# Patient Record
Sex: MALE | HISPANIC OR LATINO | Employment: FULL TIME | URBAN - METROPOLITAN AREA
[De-identification: names, ages, dates, MRNs, and addresses within clinical notes are randomized per-mention and may not be internally consistent; named-entity substitution may affect disease eponyms.]

---

## 2017-01-25 ENCOUNTER — OFFICE VISIT (OUTPATIENT)
Dept: MEDICAL GROUP | Facility: MEDICAL CENTER | Age: 22
End: 2017-01-25
Payer: COMMERCIAL

## 2017-01-25 VITALS
HEIGHT: 70 IN | BODY MASS INDEX: 40.81 KG/M2 | DIASTOLIC BLOOD PRESSURE: 78 MMHG | RESPIRATION RATE: 20 BRPM | TEMPERATURE: 97.3 F | WEIGHT: 285.06 LBS | HEART RATE: 90 BPM | OXYGEN SATURATION: 95 % | SYSTOLIC BLOOD PRESSURE: 132 MMHG

## 2017-01-25 DIAGNOSIS — E55.9 VITAMIN D DEFICIENCY: ICD-10-CM

## 2017-01-25 DIAGNOSIS — E78.2 MIXED HYPERLIPIDEMIA: ICD-10-CM

## 2017-01-25 DIAGNOSIS — M25.551 RIGHT HIP PAIN: ICD-10-CM

## 2017-01-25 DIAGNOSIS — R73.03 PREDIABETES: ICD-10-CM

## 2017-01-25 DIAGNOSIS — H93.13 TINNITUS, BILATERAL: ICD-10-CM

## 2017-01-25 PROCEDURE — 99214 OFFICE O/P EST MOD 30 MIN: CPT | Performed by: FAMILY MEDICINE

## 2017-01-25 NOTE — MR AVS SNAPSHOT
"        Peña Olson   2017 8:40 AM   Office Visit   MRN: [id number]    Department:  [place] [address]   Dept Phone:  [phone]    Description:  Male : 1995   Provider:  Hoa Garrido M.D.           Reason for Visit     Follow-Up labs      Allergies as of 2017     No Known Allergies      You were diagnosed with     Tinnitus, bilateral   [648455]       Prediabetes   [639978]       Mixed hyperlipidemia   [272.2.ICD-9-CM]       Right hip pain   [951105]         Vital Signs     Blood Pressure Pulse Temperature Respirations Height Weight    132/78 mmHg 90 36.3 °C (97.3 °F) 20 1.778 m (5' 10\") 129.3 kg (285 lb 0.9 oz)    Body Mass Index Oxygen Saturation Smoking Status             40.90 kg/m2 95% Never Smoker          Basic Information     Date Of Birth Sex Race Ethnicity Preferred Language    1995 Male  or   Origin (H[place]er,Sri Lankan,Turkmen,Andre, etc) English      Your appointments     2017  2:20 PM   Established Patient with Hoa Garrido M.D.   [facility]    [address]  [address]  [facility] [address] [phone]           You will be receiving a confirmation call a few days before your appointment from our automated call confirmation system.              Problem List              ICD-10-CM Priority Class Noted - Resolved    Tinnitus H93.19   10/19/2016 - Present    History of tics Z86.69   10/19/2016 - Present    Primary insomnia F51.01   10/19/2016 - Present    Situational anxiety F41.8   10/19/2016 - Present    Morbid obesity due to excess calories (CMS-HCC) E66.01   10/19/2016 - Present    Snoring R06.83   10/19/2016 - Present    Essential hypertension I10   10/19/2016 - Present    Prediabetes R73.03   2017 - Present    Mixed hyperlipidemia E78.2   2017 - Present      Health Maintenance        Date Due Completion Dates    IMM HEP B VACCINE (1 of 3 - Primary Series) " 1995 ---    IMM HPV VACCINE (1 of 3 - Male 3 Dose Series) 9/26/2007 ---    IMM VARICELLA (CHICKENPOX) VACCINE (1 of 2 - 2 Dose Adolescent Series) 9/26/2009 ---    IMM MENINGOCOCCAL VACCINE (MCV4) (1 of 1) 9/26/2012 ---    IMM HEP A VACCINE (2 of 2 - Standard Series) 6/9/2015 12/9/2014    IMM DTaP/Tdap/Td Vaccine (1 - Tdap) 9/26/2015 ---    IMM INFLUENZA (1) 9/1/2016 ---            Current Immunizations     Hepatitis A Vaccine, Adult 12/9/2014      Below and/or attached are the medications your provider expects you to take. Review all of your home medications and newly ordered medications with your provider and/or pharmacist. Follow medication instructions as directed by your provider and/or pharmacist. Please keep your medication list with you and share with your provider. Update the information when medications are discontinued, doses are changed, or new medications (including over-the-counter products) are added; and carry medication information at all times in the event of emergency situations     Allergies:  No Known Allergies          Medications  Valid as of: January 25, 2017 -  8:41 AM    Generic Name Brand Name Tablet Size Instructions for use    .                 Medicines prescribed today were sent to:     [facility] - [address] [address]    Phone: [phone] Fax: [phone]    Open 24 Hours?: No      Medication refill instructions:       If your prescription bottle indicates you have medication refills left, it is not necessary to call your provider’s office. Please contact your pharmacy and they will refill your medication.    If your prescription bottle indicates you do not have any refills left, you may request refills at any time through one of the following ways: The online [employer] system (except Urgent Care), by calling your provider’s office, or by asking your pharmacy to contact your provider’s office with a refill request. Medication refills are  processed only during regular business hours and may not be available until the next business day. Your provider may request additional information or to have a follow-up visit with you prior to refilling your medication.   *Please Note: Medication refills are assigned a new Rx number when refilled electronically. Your pharmacy may indicate that no refills were authorized even though a new prescription for the same medication is available at the pharmacy. Please request the medicine by name with the pharmacy before contacting your provider for a refill.        Your To Do List     Future Labs/Procedures Complete By Expires    HEMOGLOBIN A1C  As directed 1/26/2018    LIPID PROFILE  As directed 1/26/2018      Referral     A referral request has been sent to our patient care coordination department. Please allow 3-5 business days for us to process this request and contact you either by phone or mail. If you do not hear from us by the 5th business day, please call us at [phone].        Instructions    [facility]  [address] [facility] [address]  [phone]     Start taking vitamin D 2000 units every day          [employer] Access Code: Activation code not generated  Current [employer] Status: Active

## 2017-01-25 NOTE — PROGRESS NOTES
Subjective:   Peña Olson is a 21 y.o. male here today for   Chief Complaint   Patient presents with   • Follow-Up     labs       1. Tinnitus, bilateral  This is chronic. Patient did not follow-up with audiology for hearing test. And is The ringing is constant tinnitus in both ears. He does not have any hearing loss, weakness. It is staying the same. It is a high-pitched ringing sound. It is becoming more difficult to block out. He does use white noise at home    2. Prediabetes  This is a new problem. Patient is obese. He does drink sodas and does not watch his diet. He eats a lot of fast food.  Results for PEÑA OLSON (MRN [id number]) as of 1/25/2017 09:16   Ref. Range 10/25/2016 13:15   Glycohemoglobin Latest Ref Range: 0.0-5.6 % 5.9 (H)   Estim. Avg Glu Latest Units: mg/dL 123     Signature for  3. Mixed hyperlipidemia  This is a new problem. The patient does not watch his diet. He doesn't exercise. Does not drink any cardiovascular fitness.  Results for PEÑA OLSON (MRN [id number]) as of 1/25/2017 09:16   Ref. Range 10/25/2016 13:15   Cholesterol,Tot Latest Ref Range: 100-199 mg/dL 191   Triglycerides Latest Ref Range: 0-149 mg/dL 150 (H)   HDL Latest Ref Range: >=40 mg/dL 33 (A)   LDL Latest Ref Range: <100 mg/dL 128 (H)   4. Right hip pain  This is a new problem.  Started hurting 2 weeks ago. It pops out of place and feels like it is grinding on the inside. It improves after stretching. He denies any recent injury     5. Vitamin D deficiency  This is a new problem. Labs are below. He denies any falls, depression, fractures.  Results for PEÑA OLSON (MRN [id number]) as of 1/25/2017 09:16   Ref. Range 10/25/2016 13:15   25-Hydroxy   Vitamin D 25 Latest Ref Range:  ng/mL 17 (L)     Current medicines (including changes today)  No current outpatient prescriptions on file.     No current facility-administered medications for this visit.     He  has a past medical history of Hypertension. He also has  "no past medical history of Diabetes or ASTHMA.    ROS   No chest pain, no shortness of breath, no abdominal pain       Objective:     Blood pressure 132/78, pulse 90, temperature 36.3 °C (97.3 °F), resp. rate 20, height 1.778 m (5' 10\"), weight 129.3 kg (285 lb 0.9 oz), SpO2 95 %. Body mass index is 40.9 kg/(m^2).   Physical Exam:  Constitutional: Alert, no distress.  Skin: Warm, dry, good turgor, no rashes in visible areas.  Eye: Equal, round and reactive, conjunctiva clear, lids normal.  ENMT: Lips without lesions, good dentition, oropharynx clear.  Neck: Trachea midline, no masses, no thyromegaly. No cervical or supraclavicular lymphadenopathy  Respiratory: Unlabored respiratory effort, lungs clear to auscultation, no wheezes, no ronchi.  Cardiovascular: Normal S1, S2, no murmur, no edema.  Abdomen: Soft, non-tender, no masses, no hepatosplenomegaly.  Psych: Alert and oriented x3, normal affect and mood.  MSK: Limited range of motion of the hip with internal and external rotation. There is no pain with this. Negative Candice's test. No tenderness to palpation along the bursa.       Assessment and Plan:   The following treatment plan was discussed    1. Tinnitus, bilateral  Chronic, stable  A referral to audiology has been made    2. Prediabetes  New, controlled  Offered nutrition services, patient declines. He is reluctant to her underlying exercise plan understands the risks of diabetes  - HEMOGLOBIN A1C; Future    3. Mixed hyperlipidemia  New, controlled  Discussed importance of diet and exercise  - LIPID PROFILE; Future    4. Right hip pain  New, etiology likely secondary to musculoskeletal pain. Osteoarthritis is a possibility given the location.  Referral to physical therapy  Stretches given  - REFERRAL TO PHYSICAL THERAPY Reason for Therapy: Eval/Treat/Report    5. Vitamin D deficiency  This is a new problem and controlled  Vitamin D 2000 units a day      Followup: Return in about 6 months (around 7/25/2017) " for Labs, tinnitus, hip pain.

## 2017-01-25 NOTE — PATIENT INSTRUCTIONS
[facility]  [address] [facility] [address]  [phone]     Start taking vitamin D 2000 units every day

## 2017-04-10 ENCOUNTER — OFFICE VISIT (OUTPATIENT)
Dept: MEDICAL GROUP | Facility: MEDICAL CENTER | Age: 22
End: 2017-04-10
Payer: COMMERCIAL

## 2017-04-10 VITALS
BODY MASS INDEX: 40.37 KG/M2 | RESPIRATION RATE: 16 BRPM | OXYGEN SATURATION: 94 % | DIASTOLIC BLOOD PRESSURE: 78 MMHG | WEIGHT: 282 LBS | SYSTOLIC BLOOD PRESSURE: 128 MMHG | HEIGHT: 70 IN | TEMPERATURE: 97.6 F | HEART RATE: 99 BPM

## 2017-04-10 DIAGNOSIS — H92.02 LEFT EAR PAIN: ICD-10-CM

## 2017-04-10 PROBLEM — E66.01 MORBID OBESITY WITH BMI OF 40.0-44.9, ADULT (HCC): Status: ACTIVE | Noted: 2017-04-10

## 2017-04-10 PROCEDURE — 99214 OFFICE O/P EST MOD 30 MIN: CPT | Performed by: FAMILY MEDICINE

## 2017-04-10 RX ORDER — AMOXICILLIN 875 MG/1
875 TABLET, COATED ORAL 2 TIMES DAILY
Qty: 20 TAB | Refills: 0 | Status: SHIPPED | OUTPATIENT
Start: 2017-04-10 | End: 2017-04-20

## 2017-04-10 RX ORDER — FLUTICASONE PROPIONATE 50 MCG
2 SPRAY, SUSPENSION (ML) NASAL DAILY
Qty: 16 G | Refills: 3 | Status: SHIPPED | OUTPATIENT
Start: 2017-04-10 | End: 2017-07-28

## 2017-04-10 NOTE — PROGRESS NOTES
"Subjective:   Peña Olson is a 21 y.o. male here today for left ear pain  Patient arrived 10 minutes late for appointment.    Left ear pain  Patient has had left ear pain for the last week. He states that there is a placed right below his left ear, when he pushes in which is painful. He states he is also had decreased hearing over that time. Patient's last ear infection was many years ago.  He denies any drainage from the left ear.  Patient states that the pain is not severe.         Current medicines (including changes today)  No current outpatient prescriptions on file.     No current facility-administered medications for this visit.     He  has a past medical history of Hypertension. He also has no past medical history of Diabetes or ASTHMA.    ROS   No fever, no difficulty with chewing, positive tinnitus       Objective:     Blood pressure 128/78, pulse 99, temperature 36.4 °C (97.6 °F), resp. rate 16, height 1.778 m (5' 10\"), weight 127.914 kg (282 lb), SpO2 94 %. Body mass index is 40.46 kg/(m^2).   Physical Exam:  Constitutional: Alert, no distress.  Skin: Warm, dry, good turgor, no rashes in visible areas.  Eye: Equal, round and reactive, conjunctiva clear, lids normal.  ENMT: Lips without lesions, good dentition, oropharynx clear. Left TM with serous bulging greater on left than right, no rupture of TM, auditory canal is normal.  Psych: Alert and oriented x3, normal affect and mood.        Assessment and Plan:   The following treatment plan was discussed    1. Left ear pain  Possible otitis media versus eustachian tube dysfunction. Prescription for amoxicillin, Flonase and auralgan.  If no improvement consider follow-up or ENT referral.  - amoxicillin (AMOXIL) 875 MG tablet; Take 1 Tab by mouth 2 times a day for 10 days.  Dispense: 20 Tab; Refill: 0  - fluticasone (FLONASE) 50 MCG/ACT nasal spray; Spray 2 Sprays in nose every day.  Dispense: 16 g; Refill: 3  - antipyrine-benzocaine (AURALGAN) otic " solution; Place 1-4 Drops in ear every 2 hours as needed.  Dispense: 1 Bottle; Refill: 0        Followup: Return if symptoms worsen or fail to improve.

## 2017-04-10 NOTE — ASSESSMENT & PLAN NOTE
Patient has had left ear pain for the last week. He states that there is a placed right below his left ear, when he pushes in which is painful. He states he is also had decreased hearing over that time. Patient's last ear infection was many years ago.  He denies any drainage from the left ear.  Patient states that the pain is not severe.

## 2017-07-28 ENCOUNTER — HOSPITAL ENCOUNTER (OUTPATIENT)
Dept: LAB | Facility: MEDICAL CENTER | Age: 22
End: 2017-07-28
Attending: PHYSICIAN ASSISTANT
Payer: COMMERCIAL

## 2017-07-28 ENCOUNTER — OFFICE VISIT (OUTPATIENT)
Dept: MEDICAL GROUP | Facility: MEDICAL CENTER | Age: 22
End: 2017-07-28
Payer: COMMERCIAL

## 2017-07-28 VITALS
HEART RATE: 102 BPM | TEMPERATURE: 97.1 F | DIASTOLIC BLOOD PRESSURE: 82 MMHG | RESPIRATION RATE: 16 BRPM | SYSTOLIC BLOOD PRESSURE: 126 MMHG | HEIGHT: 70 IN | BODY MASS INDEX: 40.94 KG/M2 | WEIGHT: 286 LBS | OXYGEN SATURATION: 95 %

## 2017-07-28 DIAGNOSIS — E66.01 MORBID OBESITY WITH BMI OF 40.0-44.9, ADULT (HCC): ICD-10-CM

## 2017-07-28 DIAGNOSIS — E78.2 MIXED HYPERLIPIDEMIA: ICD-10-CM

## 2017-07-28 DIAGNOSIS — R73.03 PREDIABETES: ICD-10-CM

## 2017-07-28 DIAGNOSIS — R10.84 GENERALIZED ABDOMINAL PAIN: ICD-10-CM

## 2017-07-28 PROBLEM — H92.02 LEFT EAR PAIN: Status: RESOLVED | Noted: 2017-04-10 | Resolved: 2017-07-28

## 2017-07-28 LAB
ALBUMIN SERPL BCP-MCNC: 4.3 G/DL (ref 3.2–4.9)
ALBUMIN/GLOB SERPL: 1.3 G/DL
ALP SERPL-CCNC: 92 U/L (ref 30–99)
ALT SERPL-CCNC: 38 U/L (ref 2–50)
ANION GAP SERPL CALC-SCNC: 5 MMOL/L (ref 0–11.9)
AST SERPL-CCNC: 20 U/L (ref 12–45)
BASOPHILS # BLD AUTO: 0.7 % (ref 0–1.8)
BASOPHILS # BLD: 0.09 K/UL (ref 0–0.12)
BILIRUB SERPL-MCNC: 0.3 MG/DL (ref 0.1–1.5)
BUN SERPL-MCNC: 9 MG/DL (ref 8–22)
CALCIUM SERPL-MCNC: 9.8 MG/DL (ref 8.5–10.5)
CHLORIDE SERPL-SCNC: 106 MMOL/L (ref 96–112)
CO2 SERPL-SCNC: 29 MMOL/L (ref 20–33)
CREAT SERPL-MCNC: 0.97 MG/DL (ref 0.5–1.4)
EOSINOPHIL # BLD AUTO: 0.16 K/UL (ref 0–0.51)
EOSINOPHIL NFR BLD: 1.3 % (ref 0–6.9)
ERYTHROCYTE [DISTWIDTH] IN BLOOD BY AUTOMATED COUNT: 39.6 FL (ref 35.9–50)
EST. AVERAGE GLUCOSE BLD GHB EST-MCNC: 120 MG/DL
GFR SERPL CREATININE-BSD FRML MDRD: >60 ML/MIN/1.73 M 2
GLOBULIN SER CALC-MCNC: 3.2 G/DL (ref 1.9–3.5)
GLUCOSE SERPL-MCNC: 115 MG/DL (ref 65–99)
HBA1C MFR BLD: 5.8 % (ref 0–5.6)
HCT VFR BLD AUTO: 50.2 % (ref 42–52)
HGB BLD-MCNC: 16.6 G/DL (ref 14–18)
IMM GRANULOCYTES # BLD AUTO: 0.06 K/UL (ref 0–0.11)
IMM GRANULOCYTES NFR BLD AUTO: 0.5 % (ref 0–0.9)
LIPASE SERPL-CCNC: 17 U/L (ref 11–82)
LYMPHOCYTES # BLD AUTO: 4.07 K/UL (ref 1–4.8)
LYMPHOCYTES NFR BLD: 33.8 % (ref 22–41)
MCH RBC QN AUTO: 27.8 PG (ref 27–33)
MCHC RBC AUTO-ENTMCNC: 33.1 G/DL (ref 33.7–35.3)
MCV RBC AUTO: 84.1 FL (ref 81.4–97.8)
MONOCYTES # BLD AUTO: 0.87 K/UL (ref 0–0.85)
MONOCYTES NFR BLD AUTO: 7.2 % (ref 0–13.4)
NEUTROPHILS # BLD AUTO: 6.8 K/UL (ref 1.82–7.42)
NEUTROPHILS NFR BLD: 56.5 % (ref 44–72)
NRBC # BLD AUTO: 0 K/UL
NRBC BLD AUTO-RTO: 0 /100 WBC
PLATELET # BLD AUTO: 287 K/UL (ref 164–446)
PMV BLD AUTO: 10.2 FL (ref 9–12.9)
POTASSIUM SERPL-SCNC: 4.7 MMOL/L (ref 3.6–5.5)
PROT SERPL-MCNC: 7.5 G/DL (ref 6–8.2)
RBC # BLD AUTO: 5.97 M/UL (ref 4.7–6.1)
SODIUM SERPL-SCNC: 140 MMOL/L (ref 135–145)
WBC # BLD AUTO: 12.1 K/UL (ref 4.8–10.8)

## 2017-07-28 PROCEDURE — 80053 COMPREHEN METABOLIC PANEL: CPT

## 2017-07-28 PROCEDURE — 85025 COMPLETE CBC W/AUTO DIFF WBC: CPT

## 2017-07-28 PROCEDURE — 99214 OFFICE O/P EST MOD 30 MIN: CPT | Performed by: PHYSICIAN ASSISTANT

## 2017-07-28 PROCEDURE — 36415 COLL VENOUS BLD VENIPUNCTURE: CPT

## 2017-07-28 PROCEDURE — 83036 HEMOGLOBIN GLYCOSYLATED A1C: CPT

## 2017-07-28 PROCEDURE — 83690 ASSAY OF LIPASE: CPT

## 2017-07-28 ASSESSMENT — PAIN SCALES - GENERAL: PAINLEVEL: 3=SLIGHT PAIN

## 2017-07-28 NOTE — PROGRESS NOTES
"Subjective:   Peña Olson is a 21 y.o. male here today for abdominal pain intermittently for the past week.    Generalized abdominal pain  This is a 21-year-old male complains of a one week history of intermittent abdominal discomfort and pain at 3 out of 10 in various places around his stomach. Associated gas. Denies any nauseousness or vomiting. No fevers. No diarrhea or constipation. No heartburn or reflux. This morning around 10 he had a teriyaki bowl and a burger. No medications for her symptoms.    Mixed hyperlipidemia  Recently diagnosed with hyperlipidemia. His LDL level was in the 120s. He has not changed his diet since notification.    Morbid obesity with BMI of 40.0-44.9, adult (HCC)  He would prefer to be larger rather than small in size. Drinks sodas. Eats out most of the time. Does not exercise.    Prediabetes  Last hemoglobin A1c was 5.9. Looks as if it was supposed to be repeated.       Current medicines (including changes today)  No current outpatient prescriptions on file.     No current facility-administered medications for this visit.     He  has a past medical history of Hypertension. He also has no past medical history of Diabetes or ASTHMA.    ROS   No chest pain, no shortness of breath, no abdominal pain and all other systems were reviewed and are negative.       Objective:     Blood pressure 126/82, pulse 102, temperature 36.2 °C (97.1 °F), resp. rate 16, height 1.778 m (5' 10\"), weight 129.729 kg (286 lb), SpO2 95 %. Body mass index is 41.04 kg/(m^2).   Physical Exam:  Constitutional: Alert, no distress.  Skin: Warm, dry, good turgor, no rashes in visible areas.  Eye: Equal, round and reactive, conjunctiva clear, lids normal.  ENMT: Lips without lesions, good dentition, oropharynx clear.  Neck: Trachea midline, no masses.   Lymph: No cervical or supraclavicular lymphadenopathy  Respiratory: Unlabored respiratory effort, lungs clear to auscultation, no wheezes, no " wendyvonda.  Cardiovascular: Normal S1, S2, no murmur, no edema.  Abdomen: Soft, non-tender, no masses.  Psych: Alert and oriented x3, normal affect and mood.        Assessment and Plan:   The following treatment plan was discussed    1. Generalized abdominal pain  Acute, new onset condition. He does not have an acute abdomen. Symptoms likely secondary to poor food intake. Advised to eat healthier. We'll order labs. Contact me with any continuation of symptoms for possible imaging such as ultrasound versus CT scan.  - COMP METABOLIC PANEL; Future  - HEMOGLOBIN A1C; Future  - CBC WITH DIFFERENTIAL; Future  - LIPASE; Future    2. Prediabetes  Chronic history. Ordered hemoglobin A1c.  - HEMOGLOBIN A1C; Future    3. Mixed hyperlipidemia  Chronic history. He is currently not fasting so will have his PCP follow his hyperlipidemia.    4. Morbid obesity with BMI of 40.0-44.9, adult (HCC)  Urged to be healthier. Discussed with risks of obesity and developing diabetes. Suggested stopping all sodas and not eating 2 hours prior to bedtime to initiate this process.  - Patient identified as having weight management issue.  Appropriate orders and counseling given.      Followup: Return if symptoms worsen or fail to improve.    Please note that this dictation was created using voice recognition software. I have made every reasonable attempt to correct obvious errors, but I expect that there are errors of grammar and possibly content that I did not discover before finalizing the note.

## 2017-07-28 NOTE — ASSESSMENT & PLAN NOTE
He would prefer to be larger rather than small in size. Drinks sodas. Eats out most of the time. Does not exercise.

## 2017-07-28 NOTE — MR AVS SNAPSHOT
"Peña Olson   2017 11:40 AM   Office Visit   MRN: [id number]    Department:  [place] [address]   Dept Phone:  [phone]    Description:  Male : 1995   Provider:  Jhon Bolivar PA-C           Reason for Visit     Abdominal Pain on and off x 1 week      Allergies as of 2017     No Known Allergies      You were diagnosed with     Generalized abdominal pain   [120710]       Prediabetes   [127293]       Mixed hyperlipidemia   [272.2.ICD-9-CM]       Morbid obesity with BMI of 40.0-44.9, adult (CMS-HCC)   [879648]         Vital Signs     Blood Pressure Pulse Temperature Respirations Height Weight    126/82 mmHg 102 36.2 °C (97.1 °F) 16 1.778 m (5' 10\") 129.729 kg (286 lb)    Body Mass Index Oxygen Saturation Smoking Status             41.04 kg/m2 95% Never Smoker          Basic Information     Date Of Birth Sex Race Ethnicity Preferred Language    1995 Male  or   Origin ([place]an,[place]inean,Zimbabwean,Andre, etc) English      Problem List              ICD-10-CM Priority Class Noted - Resolved    Tinnitus H93.19   10/19/2016 - Present    History of tics Z86.69   10/19/2016 - Present    Primary insomnia F51.01   10/19/2016 - Present    Situational anxiety F41.8   10/19/2016 - Present    Snoring R06.83   10/19/2016 - Present    Essential hypertension I10   10/19/2016 - Present    Prediabetes R73.03   2017 - Present    Mixed hyperlipidemia E78.2   2017 - Present    Vitamin D deficiency E55.9   2017 - Present    Morbid obesity with BMI of 40.0-44.9, adult ([facility]) E66.01, Z68.41   4/10/2017 - Present    Generalized abdominal pain R10.84   2017 - Present      Health Maintenance        Date Due Completion Dates    IMM HEP B VACCINE (1 of 3 - Primary Series) 1996 ---    IMM HPV VACCINE (1 of 3 - Male 3 Dose Series) 2007 ---    IMM VARICELLA (CHICKENPOX) VACCINE (1 of 2 - 2 Dose Adolescent Series) 2009 ---    IMM MENINGOCOCCAL VACCINE " (MCV4) (1 of 1) 9/26/2012 ---    IMM HEP A VACCINE (2 of 2 - Standard Series) 6/9/2015 12/9/2014    IMM DTaP/Tdap/Td Vaccine (1 - Tdap) 9/26/2015 ---    IMM INFLUENZA (1) 9/1/2017 ---            Current Immunizations     Hepatitis A Vaccine, Adult 12/9/2014      Below and/or attached are the medications your provider expects you to take. Review all of your home medications and newly ordered medications with your provider and/or pharmacist. Follow medication instructions as directed by your provider and/or pharmacist. Please keep your medication list with you and share with your provider. Update the information when medications are discontinued, doses are changed, or new medications (including over-the-counter products) are added; and carry medication information at all times in the event of emergency situations     Allergies:  No Known Allergies          Medications  Valid as of: July 28, 2017 - 11:56 AM    Generic Name Brand Name Tablet Size Instructions for use    .                 Medicines prescribed today were sent to:     [address] [address]    Phone: [phone] Fax: [phone]    Open 24 Hours?: No    [facility] [address] [place] [address], [place] - [address] [address]    Phone: [phone] Fax: [phone]    Open 24 Hours?: No      Medication refill instructions:       If your prescription bottle indicates you have medication refills left, it is not necessary to call your provider’s office. Please contact your pharmacy and they will refill your medication.    If your prescription bottle indicates you do not have any refills left, you may request refills at any time through one of the following ways: The online [employer] system (except Urgent Care), by calling your provider’s office, or by asking your pharmacy to contact your provider’s office with a refill request. Medication refills are processed only during  regular business hours and may not be available until the next business day. Your provider may request additional information or to have a follow-up visit with you prior to refilling your medication.   *Please Note: Medication refills are assigned a new Rx number when refilled electronically. Your pharmacy may indicate that no refills were authorized even though a new prescription for the same medication is available at the pharmacy. Please request the medicine by name with the pharmacy before contacting your provider for a refill.        Your To Do List     Future Labs/Procedures Complete By Expires    CBC WITH DIFFERENTIAL  As directed 7/28/2018    COMP METABOLIC PANEL  As directed 7/28/2018    HEMOGLOBIN A1C  As directed 7/28/2018    LIPASE  As directed 7/28/2018         MyChart Access Code: Activation code not generated  Current [employer] Status: Active

## 2017-07-28 NOTE — ASSESSMENT & PLAN NOTE
This is a 21-year-old male complains of a one week history of intermittent abdominal discomfort and pain at 3 out of 10 in various places around his stomach. Associated gas. Denies any nauseousness or vomiting. No fevers. No diarrhea or constipation. No heartburn or reflux. This morning around 10 he had a teriyaki bowl and a burger. No medications for her symptoms.

## 2017-07-28 NOTE — ASSESSMENT & PLAN NOTE
Recently diagnosed with hyperlipidemia. His LDL level was in the 120s. He has not changed his diet since notification.

## 2017-07-30 DIAGNOSIS — D72.829 LEUKOCYTOSIS, UNSPECIFIED TYPE: ICD-10-CM

## 2017-07-31 ENCOUNTER — TELEPHONE (OUTPATIENT)
Dept: MEDICAL GROUP | Facility: MEDICAL CENTER | Age: 22
End: 2017-07-31

## 2017-07-31 NOTE — TELEPHONE ENCOUNTER
----- Message from Jhon Bolivar PA-C sent at 7/30/2017  9:13 AM PDT -----  Please contact Peña.  Labs without significant results.  Prediabetes is not not worse.  White blood cell count is still slightly elevated.  I will put in order for repeat CBC so do it nonfasting in 2 weeks.  Contact me with any worsening stomach symptoms.  Thank you.    Jhon

## 2017-07-31 NOTE — TELEPHONE ENCOUNTER
Phone Number Called: [phone]     Message: Left message for patient to call us back about lab results.     Left Message for patient to call back: yes and no

## 2017-08-01 NOTE — TELEPHONE ENCOUNTER
Phone Number Called: [phone] (home)     Message: number disconnected.     Left Message for patient to call back: N\A

## 2017-08-02 NOTE — TELEPHONE ENCOUNTER
Phone Number Called: [phone]    Message: Number was not working as of 08/02.    Left Message for patient to call back: N\A

## 2018-02-15 ENCOUNTER — OFFICE VISIT (OUTPATIENT)
Dept: MEDICAL GROUP | Facility: MEDICAL CENTER | Age: 23
End: 2018-02-15
Payer: COMMERCIAL

## 2018-02-15 ENCOUNTER — APPOINTMENT (OUTPATIENT)
Dept: MEDICAL GROUP | Facility: MEDICAL CENTER | Age: 23
End: 2018-02-15
Payer: COMMERCIAL

## 2018-02-15 VITALS
WEIGHT: 284.6 LBS | HEART RATE: 60 BPM | DIASTOLIC BLOOD PRESSURE: 60 MMHG | RESPIRATION RATE: 14 BRPM | BODY MASS INDEX: 40.74 KG/M2 | HEIGHT: 70 IN | TEMPERATURE: 98.2 F | SYSTOLIC BLOOD PRESSURE: 110 MMHG | OXYGEN SATURATION: 98 %

## 2018-02-15 DIAGNOSIS — Z86.59 HISTORY OF TICS: ICD-10-CM

## 2018-02-15 DIAGNOSIS — L91.8 INFLAMED SKIN TAG: ICD-10-CM

## 2018-02-15 DIAGNOSIS — R73.03 PREDIABETES: ICD-10-CM

## 2018-02-15 DIAGNOSIS — I10 ESSENTIAL HYPERTENSION: ICD-10-CM

## 2018-02-15 DIAGNOSIS — E66.01 MORBID OBESITY WITH BMI OF 40.0-44.9, ADULT (HCC): ICD-10-CM

## 2018-02-15 DIAGNOSIS — E55.9 VITAMIN D DEFICIENCY: ICD-10-CM

## 2018-02-15 DIAGNOSIS — E78.2 MIXED HYPERLIPIDEMIA: ICD-10-CM

## 2018-02-15 PROBLEM — R10.84 GENERALIZED ABDOMINAL PAIN: Status: RESOLVED | Noted: 2017-07-28 | Resolved: 2018-02-15

## 2018-02-15 PROCEDURE — 99214 OFFICE O/P EST MOD 30 MIN: CPT | Performed by: FAMILY MEDICINE

## 2018-02-15 ASSESSMENT — PATIENT HEALTH QUESTIONNAIRE - PHQ9: CLINICAL INTERPRETATION OF PHQ2 SCORE: 0

## 2018-02-15 NOTE — ASSESSMENT & PLAN NOTE
This had been a chronic problem for this patient in the past but is now going to the gym fairly regularly. Possibly that has helped his blood pressure normalized. His blood pressure here is 110/60 and his mom states that when she is checking his blood pressure at home and been good. He is not on medications. He's actually can start working on weight loss and I suspect his blood pressure will stay normal.

## 2018-02-15 NOTE — ASSESSMENT & PLAN NOTE
This patient is accompanied today by his mother who tells me that in the last 6 months he's had tics both in [place] and [place]. They're very concerned about the possibility of Lyme disease. They want him to be tested for this. He does not give any history of target skin lesions or joint pain. He does know that his tics were in his body for at least a day before he got them removed. We will get the blood work and see him back.

## 2018-02-15 NOTE — ASSESSMENT & PLAN NOTE
This is a chronic problem that the patient has had since teenager age. He states that as he went through puberty it started to get bigger. Now irritated by his clothing. He would like to have it removed. We will do that at his next visit in 4 weeks.

## 2018-02-16 NOTE — ASSESSMENT & PLAN NOTE
This is a chronic health problems for this patient that has been there since July 2017. Patient has not really done anything to try and get his lipids under control. He is getting ready to start a weight loss program. I would like to recheck his lipids in 6 weeks and see if he is able to achieve an increase in his HDL and get his LDL less than 100.

## 2018-02-16 NOTE — ASSESSMENT & PLAN NOTE
This patient has gone off of vitamin D supplementation despite the fact that he had a vitamin D level is only 17. We are going to have him start on vitamin D at 5000 units daily and recheck his level in 6 weeks.

## 2018-02-16 NOTE — ASSESSMENT & PLAN NOTE
This patient has a history of an elevated blood sugar up to 1:15 and July 2017. We will retest his baseline and get an A1c.

## 2018-02-16 NOTE — ASSESSMENT & PLAN NOTE
This is a chronic health problem for this patient that he has not been working on recently. We had a long discussion about the family history of diabetes and the need for him to lose weight. He is getting ready to start in interval training program along with lifting weights to see if that will be successful. We'll plan to see him back in 6 weeks hopefully with a 12 pound weight loss.

## 2018-02-16 NOTE — PROGRESS NOTES
CC: Hypertension, inflamed skin tags, history of tics, other chronic problems.    HISTORY OF PRESENT ILLNESS: Patient is a 22 y.o. male established patient who presents today to [facility] care transferring from Dr. Chairez. Patient has multiple chronic health problems as listed below. The reason he wanted to come in was he felt that he had a breast lump underneath the right nipple. What he is feeling is normal tissue. We will recheck this in 6 weeks when he returns.    Health Maintenance: Completed    Essential hypertension  This had been a chronic problem for this patient in the past but is now going to the gym fairly regularly. Possibly that has helped his blood pressure normalized. His blood pressure here is 110/60 and his mom states that when she is checking his blood pressure at home and been good. He is not on medications. He's actually can start working on weight loss and I suspect his blood pressure will stay normal.    Inflamed skin tag  This is a chronic problem that the patient has had since teenager age. He states that as he went through puberty it started to get bigger. Now irritated by his clothing. He would like to have it removed. We will do that at his next visit in 4 weeks.    History of tics  This patient is accompanied today by his mother who tells me that in the last 6 months he's had tics both in [place] and [place]. They're very concerned about the possibility of Lyme disease. They want him to be tested for this. He does not give any history of target skin lesions or joint pain. He does know that his tics were in his body for at least a day before he got them removed. We will get the blood work and see him back.    Mixed hyperlipidemia  This is a chronic health problems for this patient that has been there since July 2017. Patient has not really done anything to try and get his lipids under control. He is getting ready to start a weight loss program. I would like to recheck his  lipids in 6 weeks and see if he is able to achieve an increase in his HDL and get his LDL less than 100.    Morbid obesity with BMI of 40.0-44.9, adult ([facility])  This is a chronic health problem for this patient that he has not been working on recently. We had a long discussion about the family history of diabetes and the need for him to lose weight. He is getting ready to start in interval training program along with lifting weights to see if that will be successful. We'll plan to see him back in 6 weeks hopefully with a 12 pound weight loss.    Prediabetes  This patient has a history of an elevated blood sugar up to 1:15 and July 2017. We will retest his baseline and get an A1c.    Vitamin D deficiency  This patient has gone off of vitamin D supplementation despite the fact that he had a vitamin D level is only 17. We are going to have him start on vitamin D at 5000 units daily and recheck his level in 6 weeks.      Patient Active Problem List    Diagnosis Date Noted   • Inflamed skin tag 02/15/2018   • Morbid obesity with BMI of 40.0-44.9, adult (HCC) 04/10/2017   • Prediabetes 01/25/2017   • Mixed hyperlipidemia 01/25/2017   • Vitamin D deficiency 01/25/2017   • Tinnitus 10/19/2016   • History of tics 10/19/2016   • Primary insomnia 10/19/2016   • Situational anxiety 10/19/2016   • Snoring 10/19/2016      Allergies:Patient has no known allergies.    No current outpatient prescriptions on file.     No current facility-administered medications for this visit.        Social History   Substance Use Topics   • Smoking status: Never Smoker   • Smokeless tobacco: Never Used   • Alcohol use No     Social History     Social History Narrative   • No narrative on file       Family History   Problem Relation Age of Onset   • Hypertension Mother    • Cancer Maternal Aunt      bipolar   • Breast Cancer Maternal Aunt    • Cancer Maternal Grandmother      schizophrenia   • Hypertension Maternal Grandfather    • Diabetes Maternal  "Grandfather    • Heart Disease Maternal Grandfather    • No Known Problems Father    • Diabetes Paternal Grandfather         ROS:     - Constitutional:  Negative for fever, chills, unexpected weight change, and fatigue/generalized weakness.    - HEENT:  Negative for headaches, vision changes, hearing changes, ear pain, ear discharge, rhinorrhea, sinus congestion, sore throat, and neck pain.      - Respiratory: Negative for cough, sputum production, chest congestion, dyspnea, wheezing, and crackles.      - Cardiovascular: Negative for chest pain, palpitations, orthopnea, and bilateral lower extremity edema.     - Gastrointestinal: Negative for heartburn, nausea, vomiting, abdominal pain, hematochezia, melena, diarrhea, constipation, and greasy/foul-smelling stools.     - Genitourinary: Negative for dysuria, polyuria, hematuria, pyuria, urinary urgency, and urinary incontinence.     - Musculoskeletal: Negative for myalgias, back pain, and joint pain.     - Skin: Positive for an inflamed skin tag in the middle of his back. Irritated by clothing. Bleeds periodically.  Patient concerned about a lump underneath the nipple on the right breast.    - Neurological: Negative for dizziness, tingling, tremors, focal sensory deficit, focal weakness and headaches.     - Endo/Heme/Allergies: Does not bruise/bleed easily.     - Psychiatric/Behavioral: Negative for depression, suicidal/homicidal ideation and memory loss.          - NOTE: All other systems reviewed and are negative, except as in HPI.      Exam:    Blood pressure 110/60, pulse 60, temperature 36.8 °C (98.2 °F), resp. rate 14, height 1.778 m (5' 10\"), weight (!) 129.1 kg (284 lb 9.6 oz), SpO2 98 %. Body mass index is 40.84 kg/m².    General:  Well nourished, well developed male in NAD  Head is grossly normal.  Neck: Supple without JVD or bruit. Thyroid is not enlarged.  Pulmonary: Clear to ausculation and percussion.  Normal effort. No rales, ronchi, or " wheezing.  Cardiovascular: Regular rate and rhythm without murmur. Carotid and radial pulses are intact and equal bilaterally.  Extremities: no clubbing, cyanosis, or edema.  Skin: Patient has a large inflamed skin tag in the middle of his back shows some recent excoriation. Measures approximately 1 cm across and extends up away from the skin 1 cm.  Please note that this dictation was created using voice recognition software. I have made every reasonable attempt to correct obvious errors, but I expect that there are errors of grammar and possibly content that I did not discover before finalizing the note.    Assessment/Plan:  1. Essential hypertension  Appears to have resolved with lifestyle changes    2. Inflamed skin tag  Uncontrolled, we will remove this at his next office visit    3. Morbid obesity with BMI of 40.0-44.9, adult (HCC)  Uncontrolled, patient working on weight loss efforts  - Patient identified as having weight management issue.  Appropriate orders and counseling given.    4. Prediabetes  Uncontrolled, patient working weight loss efforts  - HEMOGLOBIN A1C; Future    5. Mixed hyperlipidemia  Uncontrolled, patient not doing anything to try and improve this. We will check baseline and see him back.  - COMP METABOLIC PANEL; Future  - LIPID PROFILE; Future    6. History of tics  Stable, patient does have a history of tick exposure in states where Burgdorferi could've been present. We will check for Lyme disease  - LYME (B. BURGDORFERI) PCR    7. Vitamin D deficiency  uncontrolled, patient went off vitamin D and his levels down to 17. He needs to be on 5000 units daily and we will recheck in 6 weeks.  - VITAMIN D,25 HYDROXY; Future

## 2018-02-21 ENCOUNTER — HOSPITAL ENCOUNTER (OUTPATIENT)
Dept: LAB | Facility: MEDICAL CENTER | Age: 23
End: 2018-02-21
Attending: FAMILY MEDICINE
Payer: COMMERCIAL

## 2018-02-21 DIAGNOSIS — E78.2 MIXED HYPERLIPIDEMIA: ICD-10-CM

## 2018-02-21 DIAGNOSIS — R73.03 PREDIABETES: ICD-10-CM

## 2018-02-21 DIAGNOSIS — E55.9 VITAMIN D DEFICIENCY: ICD-10-CM

## 2018-02-21 LAB
25(OH)D3 SERPL-MCNC: 6 NG/ML (ref 30–100)
ALBUMIN SERPL BCP-MCNC: 4.6 G/DL (ref 3.2–4.9)
ALBUMIN/GLOB SERPL: 1.5 G/DL
ALP SERPL-CCNC: 103 U/L (ref 30–99)
ALT SERPL-CCNC: 37 U/L (ref 2–50)
ANION GAP SERPL CALC-SCNC: 9 MMOL/L (ref 0–11.9)
AST SERPL-CCNC: 22 U/L (ref 12–45)
BILIRUB SERPL-MCNC: 0.5 MG/DL (ref 0.1–1.5)
BUN SERPL-MCNC: 13 MG/DL (ref 8–22)
CALCIUM SERPL-MCNC: 9.2 MG/DL (ref 8.5–10.5)
CHLORIDE SERPL-SCNC: 107 MMOL/L (ref 96–112)
CHOLEST SERPL-MCNC: 167 MG/DL (ref 100–199)
CO2 SERPL-SCNC: 26 MMOL/L (ref 20–33)
CREAT SERPL-MCNC: 0.83 MG/DL (ref 0.5–1.4)
EST. AVERAGE GLUCOSE BLD GHB EST-MCNC: 123 MG/DL
GLOBULIN SER CALC-MCNC: 3 G/DL (ref 1.9–3.5)
GLUCOSE SERPL-MCNC: 81 MG/DL (ref 65–99)
HBA1C MFR BLD: 5.9 % (ref 0–5.6)
HDLC SERPL-MCNC: 32 MG/DL
LDLC SERPL CALC-MCNC: 100 MG/DL
POTASSIUM SERPL-SCNC: 4 MMOL/L (ref 3.6–5.5)
PROT SERPL-MCNC: 7.6 G/DL (ref 6–8.2)
SODIUM SERPL-SCNC: 142 MMOL/L (ref 135–145)
TRIGL SERPL-MCNC: 173 MG/DL (ref 0–149)

## 2018-02-21 PROCEDURE — 82306 VITAMIN D 25 HYDROXY: CPT

## 2018-02-21 PROCEDURE — 86617 LYME DISEASE ANTIBODY: CPT | Mod: 91

## 2018-02-21 PROCEDURE — 83036 HEMOGLOBIN GLYCOSYLATED A1C: CPT

## 2018-02-21 PROCEDURE — 36415 COLL VENOUS BLD VENIPUNCTURE: CPT

## 2018-02-21 PROCEDURE — 80053 COMPREHEN METABOLIC PANEL: CPT

## 2018-02-21 PROCEDURE — 80061 LIPID PANEL: CPT

## 2018-02-23 LAB
B BURGDOR IGG SER QL IB: NEGATIVE
B BURGDOR IGM SER QL IB: NEGATIVE

## 2018-02-28 ENCOUNTER — HOSPITAL ENCOUNTER (EMERGENCY)
Dept: HOSPITAL 8 - ED | Age: 23
Discharge: HOME | End: 2018-02-28
Payer: COMMERCIAL

## 2018-02-28 VITALS — SYSTOLIC BLOOD PRESSURE: 147 MMHG | DIASTOLIC BLOOD PRESSURE: 89 MMHG

## 2018-02-28 VITALS — HEIGHT: 70 IN | WEIGHT: 275.58 LBS | BODY MASS INDEX: 39.45 KG/M2
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
## 2018-02-28 DIAGNOSIS — I10: ICD-10-CM

## 2018-02-28 DIAGNOSIS — S39.012A: Primary | ICD-10-CM

## 2018-02-28 PROCEDURE — 99284 EMERGENCY DEPT VISIT MOD MDM: CPT

## 2018-02-28 PROCEDURE — 72110 X-RAY EXAM L-2 SPINE 4/>VWS: CPT

## 2018-02-28 PROCEDURE — 96372 THER/PROPH/DIAG INJ SC/IM: CPT

## 2018-03-09 ENCOUNTER — OFFICE VISIT (OUTPATIENT)
Dept: URGENT CARE | Facility: CLINIC | Age: 23
End: 2018-03-09
Payer: COMMERCIAL

## 2018-03-09 ENCOUNTER — APPOINTMENT (OUTPATIENT)
Dept: RADIOLOGY | Facility: IMAGING CENTER | Age: 23
End: 2018-03-09
Attending: PHYSICIAN ASSISTANT
Payer: COMMERCIAL

## 2018-03-09 VITALS
SYSTOLIC BLOOD PRESSURE: 110 MMHG | BODY MASS INDEX: 40.4 KG/M2 | HEIGHT: 70 IN | RESPIRATION RATE: 18 BRPM | WEIGHT: 282.19 LBS | HEART RATE: 84 BPM | OXYGEN SATURATION: 95 % | DIASTOLIC BLOOD PRESSURE: 86 MMHG | TEMPERATURE: 97.9 F

## 2018-03-09 DIAGNOSIS — M25.551 PAIN OF RIGHT HIP JOINT: ICD-10-CM

## 2018-03-09 DIAGNOSIS — S39.012D BACK STRAIN, SUBSEQUENT ENCOUNTER: ICD-10-CM

## 2018-03-09 DIAGNOSIS — M54.31 SCIATICA OF RIGHT SIDE: ICD-10-CM

## 2018-03-09 PROCEDURE — 99214 OFFICE O/P EST MOD 30 MIN: CPT | Performed by: PHYSICIAN ASSISTANT

## 2018-03-09 PROCEDURE — 73501 X-RAY EXAM HIP UNI 1 VIEW: CPT | Mod: TC,RT | Performed by: PHYSICIAN ASSISTANT

## 2018-03-09 RX ORDER — NAPROXEN 500 MG/1
TABLET ORAL
Refills: 0 | COMMUNITY
Start: 2018-03-01 | End: 2018-03-29 | Stop reason: SDUPTHER

## 2018-03-09 RX ORDER — TRAMADOL HYDROCHLORIDE 50 MG/1
50 TABLET ORAL EVERY 8 HOURS PRN
Qty: 15 TAB | Refills: 0 | Status: SHIPPED | OUTPATIENT
Start: 2018-03-09 | End: 2018-03-14

## 2018-03-09 RX ORDER — METHOCARBAMOL 750 MG/1
TABLET, FILM COATED ORAL
Refills: 0 | COMMUNITY
Start: 2018-03-01 | End: 2018-04-02 | Stop reason: SDUPTHER

## 2018-03-09 RX ORDER — GABAPENTIN 300 MG/1
300 CAPSULE ORAL 3 TIMES DAILY
COMMUNITY
End: 2018-03-29 | Stop reason: SDUPTHER

## 2018-03-09 RX ORDER — METHYLPREDNISOLONE 4 MG/1
TABLET ORAL
Qty: 1 KIT | Refills: 0 | Status: SHIPPED | OUTPATIENT
Start: 2018-03-09 | End: 2018-05-01

## 2018-03-09 RX ORDER — IBUPROFEN 800 MG/1
800 TABLET ORAL EVERY 8 HOURS PRN
COMMUNITY
End: 2018-03-29

## 2018-03-09 ASSESSMENT — ENCOUNTER SYMPTOMS
ABDOMINAL PAIN: 0
FALLS: 0
SORE THROAT: 0
PERIANAL NUMBNESS: 0
EYE REDNESS: 0
DIARRHEA: 0
EYE DISCHARGE: 0
WHEEZING: 0
BOWEL INCONTINENCE: 0
FEVER: 0
COUGH: 0
PARESTHESIAS: 0
BACK PAIN: 1
CHILLS: 0
LEG PAIN: 0
NECK PAIN: 0
HEADACHES: 0
NAUSEA: 0
SINUS PAIN: 0
TINGLING: 0
SENSORY CHANGE: 0
NUMBNESS: 0
VOMITING: 0

## 2018-03-09 NOTE — PROGRESS NOTES
"Subjective:      Peña Olson is a 22 y.o. male who presents with Back Pain (x 9 days, Rt. lower back pain and Rt. hip pain after heavy lifting \"was seen at [facility] for this Problem\")          Patient is a 22-year-old male who presents today with continued right lower back pain with radiation down the back of his thigh since   Feb. 28 when he was originally evaluated at [facility]. Patient admits earlier that day he was at the gym carrying a heavy weight which he felt a \"tweak\" to his right mid to lower back after the dead lifts. He reports that he continued with the rest of his work out and notes that the pain became worse throughout the day. He denies any hx of same or other injury. Patient went to the emergency room and he was noted to have normal lumbar x-ray. Patient had significant improvement after Toradol injection and was also given Percocet. Patient was discharged with methocarbamol along with naproxen. Patient was given referral to follow up with Dr. Arceo spinal specialist along with [facility]. Patient reports minimal improvement with those medications although has had mild improvement with Lidoderm patch along with the TENS unit. He denies any saddle anesthesias, leg weakness, incontinence.  Note patient also reports right hip pain off and on for the last few years. Patient denies any originating injury, or trauma. He reports that he feels a grinding in the hip.    Back Pain   This is a new problem. Episode onset: Feb. 28.  The problem occurs constantly. The pain is at a severity of 7/10. The pain is moderate. The symptoms are aggravated by bending and position. Pertinent negatives include no abdominal pain, bladder incontinence, bowel incontinence, chest pain, dysuria, fever, headaches, leg pain, numbness, paresthesias, pelvic pain, perianal numbness or tingling. Risk factors include obesity. Treatments tried: As above.        Review of Systems   Constitutional: Negative for " "chills, fever and malaise/fatigue.   HENT: Negative for ear discharge, ear pain, sinus pain and sore throat.    Eyes: Negative for discharge and redness.   Respiratory: Negative for cough and wheezing.    Cardiovascular: Negative for chest pain and leg swelling.   Gastrointestinal: Negative for abdominal pain, bowel incontinence, diarrhea, nausea and vomiting.   Genitourinary: Negative for bladder incontinence, dysuria, pelvic pain and urgency.        Denies any new urinary or bowel incontinence   Musculoskeletal: Positive for back pain and joint pain. Negative for falls and neck pain.        Specifically without leg pain or weakness   Skin: Negative for itching and rash.   Neurological: Negative for tingling, sensory change, numbness, headaches and paresthesias.          Objective:     /86   Pulse 84   Temp 36.6 °C (97.9 °F)   Resp 18   Ht 1.778 m (5' 10\")   Wt (!) 128 kg (282 lb 3 oz)   SpO2 95%   BMI 40.49 kg/m²    PMH:  has a past medical history of Hypertension and Inflamed skin tag (2/15/2018). He also has no past medical history of ASTHMA or Diabetes.  MEDS:   Current Outpatient Prescriptions:   •  gabapentin (NEURONTIN) 300 MG Cap, Take 300 mg by mouth 3 times a day., Disp: , Rfl:   •  ibuprofen (MOTRIN) 800 MG Tab, Take 800 mg by mouth every 8 hours as needed., Disp: , Rfl:   •  tramadol (ULTRAM) 50 MG Tab, Take 1 Tab by mouth every 8 hours as needed for Severe Pain for up to 5 days., Disp: 15 Tab, Rfl: 0  •  MethylPREDNISolone (MEDROL DOSEPAK) 4 MG Tablet Therapy Pack, UAD, Disp: 1 Kit, Rfl: 0  •  methocarbamol (ROBAXIN) 750 MG Tab, TK 2 TS PO Q 6 H PRF MUSCLE SPASM, Disp: , Rfl: 0  •  naproxen (NAPROSYN) 500 MG Tab, TK 1 T PO  Q 12 H PRF PAIN, Disp: , Rfl: 0  ALLERGIES: No Known Allergies  SURGHX: No past surgical history on file.  SOCHX:  reports that he has never smoked. He has never used smokeless tobacco. He reports that he does not drink alcohol or use drugs.  FH: Family history was " reviewed, no pertinent findings to report    Physical Exam   Constitutional: He is oriented to person, place, and time. He appears well-developed and well-nourished. No distress.   HENT:   Head: Normocephalic and atraumatic.   Right Ear: External ear normal.   Left Ear: External ear normal.   Mouth/Throat: Oropharynx is clear and moist. No oropharyngeal exudate.   Eyes: Conjunctivae and EOM are normal. Pupils are equal, round, and reactive to light.   Neck: Normal range of motion. Neck supple. No tracheal deviation present.   Cardiovascular: Normal rate and regular rhythm.    No murmur heard.  Pulmonary/Chest: Effort normal and breath sounds normal. No respiratory distress.   Musculoskeletal:        Lumbar back: He exhibits decreased range of motion, tenderness, swelling, pain, spasm and abnormal pulse.        Back:    Spine- without midline tenderness, step-off or deformity. Without scoliosis or kyphosis. Noted right lower thoracic Paraspinous spasm with slight fullness. Limited ROM with lateral bend and extension .FROM with flexion.   Tenderness along the right sacroiliac notch with reproducible symptoms.   Sensation intact bilaterally, BLE motor 5/5 and symmetrical  strength. Pos. Right Straight leg raise.  Gait- WNL without foot drop.   FROM with right hip and pelvic along slight crepitus with internal rotation.      Neurological: He is alert and oriented to person, place, and time. Coordination normal.   Skin: Skin is warm. No rash noted.   Psychiatric: He has a normal mood and affect. His behavior is normal. Judgment and thought content normal.   Vitals reviewed.         XRAY:     No acute right hip fracture. If symptoms are persistent, would recommend consideration MRI for further evaluation.    Assessment/Plan:     1. Pain of right hip joint  - DX-HIP-UNILATERAL-WITH PELVIS-1 VIEW RIGHT; Future  - tramadol (ULTRAM) 50 MG Tab; Take 1 Tab by mouth every 8 hours as needed for Severe Pain for up to 5 days.   Dispense: 15 Tab; Refill: 0  - MethylPREDNISolone (MEDROL DOSEPAK) 4 MG Tablet Therapy Pack; UAD  Dispense: 1 Kit; Refill: 0  - CONSENT FOR OPIATE PRESCRIPTION    2. Back strain, subsequent encounter  - tramadol (ULTRAM) 50 MG Tab; Take 1 Tab by mouth every 8 hours as needed for Severe Pain for up to 5 days.  Dispense: 15 Tab; Refill: 0  - MethylPREDNISolone (MEDROL DOSEPAK) 4 MG Tablet Therapy Pack; UAD  Dispense: 1 Kit; Refill: 0  - CONSENT FOR OPIATE PRESCRIPTION    3. Sciatica of right side  - tramadol (ULTRAM) 50 MG Tab; Take 1 Tab by mouth every 8 hours as needed for Severe Pain for up to 5 days.  Dispense: 15 Tab; Refill: 0  - MethylPREDNISolone (MEDROL DOSEPAK) 4 MG Tablet Therapy Pack; UAD  Dispense: 1 Kit; Refill: 0  - CONSENT FOR OPIATE PRESCRIPTION    NARXCHECK was reviewed by myself-  Document does not reveal any concerning patterns. Pt. was advised to avoid the operation of heavy machine along with driving while on such medications. Finally pt. was advised to use medication only as prescribed.   And reviewed [facility] records confirmed lumbar x-ray was negative. He was given referral to see Dr. Arceo at [facility]- STRONGLY encouraged close follow up if steroid and pain meds are not helping as he may need further imaging modalities they opted to see his PCP for follow up for his right hip pain as well- as again the xray is WNL- however if failing conservative therapies- pt. May need recheck and possible again further imaging. Mother and patient both agree today and understand.     Patient given precautionary s/sx that mandate immediate follow up and evaluation in the ED. Advised of risks of not doing so.    DDX, Supportive care, and indications for immediate follow-up discussed with patient.    Instructed to return to clinic or nearest emergency department if we are not available for any change in condition, further concerns, or worsening of symptoms.    The patient demonstrated a good understanding and  agreed with the treatment plan.

## 2018-03-29 ENCOUNTER — OFFICE VISIT (OUTPATIENT)
Dept: MEDICAL GROUP | Facility: MEDICAL CENTER | Age: 23
End: 2018-03-29
Payer: COMMERCIAL

## 2018-03-29 VITALS
BODY MASS INDEX: 40.02 KG/M2 | SYSTOLIC BLOOD PRESSURE: 130 MMHG | OXYGEN SATURATION: 98 % | WEIGHT: 279.54 LBS | HEIGHT: 70 IN | HEART RATE: 69 BPM | TEMPERATURE: 98.2 F | DIASTOLIC BLOOD PRESSURE: 88 MMHG

## 2018-03-29 DIAGNOSIS — E55.9 VITAMIN D DEFICIENCY: ICD-10-CM

## 2018-03-29 DIAGNOSIS — R73.03 PREDIABETES: ICD-10-CM

## 2018-03-29 DIAGNOSIS — L91.8 SKIN TAG: ICD-10-CM

## 2018-03-29 DIAGNOSIS — Z79.891 USE OF OPIATES FOR THERAPEUTIC PURPOSES: ICD-10-CM

## 2018-03-29 DIAGNOSIS — E78.1 HYPERTRIGLYCERIDEMIA: ICD-10-CM

## 2018-03-29 DIAGNOSIS — M54.41 ACUTE RIGHT-SIDED LOW BACK PAIN WITH RIGHT-SIDED SCIATICA: ICD-10-CM

## 2018-03-29 PROCEDURE — 99214 OFFICE O/P EST MOD 30 MIN: CPT | Performed by: FAMILY MEDICINE

## 2018-03-29 RX ORDER — NAPROXEN 500 MG/1
500 TABLET ORAL 2 TIMES DAILY WITH MEALS
Qty: 60 TAB | Refills: 0 | Status: SHIPPED | OUTPATIENT
Start: 2018-03-29 | End: 2018-04-28

## 2018-03-29 RX ORDER — OXYCODONE AND ACETAMINOPHEN 10; 325 MG/1; MG/1
1 TABLET ORAL EVERY 8 HOURS PRN
Qty: 60 TAB | Refills: 0 | Status: SHIPPED | OUTPATIENT
Start: 2018-03-29 | End: 2018-04-18

## 2018-03-29 RX ORDER — GABAPENTIN 300 MG/1
300 CAPSULE ORAL 2 TIMES DAILY
Qty: 60 CAP | Refills: 1 | Status: SHIPPED | OUTPATIENT
Start: 2018-03-29 | End: 2018-04-12 | Stop reason: SDUPTHER

## 2018-03-29 NOTE — ASSESSMENT & PLAN NOTE
This is an acute problem that started for this patient on 2/28/18 when he was lifting at the gym and felt a very sharp pain in his low back.  He did complete his work out but an hour later he could not even walk without severe pain. He called his mom who brought him a walker because she has a bad back. He's been utilizing her walker and was seen again in the urgent care on 3/9/18. They gave him tramadol and naproxen which did not provide him with any relief. He's actually had to borrow some of his mom's Percocet because his pain becomes so severe at times. He states on an average his pain is 6 out of 10. He denies saddle anesthesia, incontinence or any red flag symptoms. He does state that on a bad day his pain is 10 out of 10. It has greatly limited what he's been able to do. He is still having to utilize a walker at times.    Patient did do a Medrol Dosepak as of 3/9/18 through the urgent care which gave him some minimal improvement in his pain but did not take it away. He has had nothing that is taking the pain away since the initial injury. He has also tried some of his mom's Lidoderm patches which decrease or dull his pain but do not take it away.    Patient is accompanied by his mom who tells me that she has given him Percocet 7.5/325 one twice a day for 2 days earlier this week. His last dose of Percocet was over 24 hours ago. He tried tramadol in the last 24 hours utilizing 100 mg twice a day without relief of his pain.

## 2018-03-29 NOTE — ASSESSMENT & PLAN NOTE
This patient has sustained a low back injury acutely on 2/28/18. He is going to need further workup. We are going to have to utilize narcotic pain medications for him. We will have him sign a pain medicine agreement. He's had to borrow some Percocet from his mom because tramadol taking 100 mg twice a day did not control his pain. There is every possibility that that was under dosed for the acute injury and might be why it did not seem to help him. At this point we will go ahead and get a urine drug screen. He has not had a Percocet for 2 days cracked and he has not had a tramadol since yesterday.. We will sign an agreement today.

## 2018-03-29 NOTE — ASSESSMENT & PLAN NOTE
This is a chronic health problem for this patient that is not adequately controlled. Patient was not on vitamin D replacement. He is now taking 5000 units of vitamin D daily. His level was only 6 on his blood work back in February. We will plan to recheck this in 3 months.

## 2018-03-29 NOTE — PROGRESS NOTES
CC:Diagnoses of Skin tag, Acute right-sided low back pain with right-sided sciatica, Use of opiates for therapeutic purposes, Vitamin D deficiency, Hypertriglyceridemia, and Prediabetes were pertinent to this visit.    HISTORY OF PRESENT ILLNESS: Patient is a 22 y.o. male established patient who presents today to have a skin tag removed but turns out the patient had a back injury on 2/28/18 is still suffering severely with this. Patient states that he can barely walk and is having times where his pain is so severe that he has to utilize his mom's front-wheeled walker.    Health Maintenance: Completed    Acute right-sided low back pain with right-sided sciatica  This is an acute problem that started for this patient on 2/28/18 when he was lifting at the gym and felt a very sharp pain in his low back.  He did complete his work out but an hour later he could not even walk without severe pain. He called his mom who brought him a walker because she has a bad back. He's been utilizing her walker and was seen again in the urgent care on 3/9/18. They gave him tramadol and naproxen which did not provide him with any relief. He's actually had to borrow some of his mom's Percocet because his pain becomes so severe at times. He states on an average his pain is 6 out of 10. He denies saddle anesthesia, incontinence or any red flag symptoms. He does state that on a bad day his pain is 10 out of 10. It has greatly limited what he's been able to do. He is still having to utilize a walker at times.    Patient did do a Medrol Dosepak as of 3/9/18 through the urgent care which gave him some minimal improvement in his pain but did not take it away. He has had nothing that is taking the pain away since the initial injury. He has also tried some of his mom's Lidoderm patches which decrease or dull his pain but do not take it away.    Patient is accompanied by his mom who tells me that she has given him Percocet 7.5/325 one twice a day  for 2 days earlier this week. His last dose of Percocet was over 24 hours ago. He tried tramadol in the last 24 hours utilizing 100 mg twice a day without relief of his pain.    Use of opiates for therapeutic purposes  This patient has sustained a low back injury acutely on 2/28/18. He is going to need further workup. We are going to have to utilize narcotic pain medications for him. We will have him sign a pain medicine agreement. He's had to borrow some Percocet from his mom because tramadol taking 100 mg twice a day did not control his pain. There is every possibility that that was under dosed for the acute injury and might be why it did not seem to help him. At this point we will go ahead and get a urine drug screen. He has not had a Percocet for 2 days cracked and he has not had a tramadol since yesterday.. We will sign an agreement today.    Vitamin D deficiency  This is a chronic health problem for this patient that is not adequately controlled. Patient was not on vitamin D replacement. He is now taking 5000 units of vitamin D daily. His level was only 6 on his blood work back in February. We will plan to recheck this in 3 months.    Hypertriglyceridemia  This is a chronic health problem that is not adequately controlled. Patient's blood work shows that his total cholesterol is 167, triglycerides 173, HDL 32 and his LDL is upper limits of normal at 100. Patient will work on dietary changes. He has lost some weight but unfortunately has hurt his back by working out at the gym. We will need to follow with him.      Patient Active Problem List    Diagnosis Date Noted   • Acute right-sided low back pain with right-sided sciatica 03/29/2018   • Use of opiates for therapeutic purposes 03/29/2018   • Hypertriglyceridemia 03/29/2018   • Inflamed skin tag 02/15/2018   • Morbid obesity with BMI of 40.0-44.9, adult (HCC) 04/10/2017   • Prediabetes 01/25/2017   • Mixed hyperlipidemia 01/25/2017   • Vitamin D deficiency  01/25/2017   • Tinnitus 10/19/2016   • History of tics 10/19/2016   • Primary insomnia 10/19/2016   • Situational anxiety 10/19/2016   • Snoring 10/19/2016      Allergies:Patient has no known allergies.    Current Outpatient Prescriptions   Medication Sig Dispense Refill   • oxyCODONE-acetaminophen (PERCOCET-10)  MG Tab Take 1 Tab by mouth every 8 hours as needed for Severe Pain for up to 20 days. Must last for 20 days 60 Tab 0   • gabapentin (NEURONTIN) 300 MG Cap Take 1 Cap by mouth 2 Times a Day. 60 Cap 1   • naproxen (NAPROSYN) 500 MG Tab Take 1 Tab by mouth 2 times a day, with meals for 30 days. 60 Tab 0   • methocarbamol (ROBAXIN) 750 MG Tab TK 2 TS PO Q 6 H PRF MUSCLE SPASM  0   • MethylPREDNISolone (MEDROL DOSEPAK) 4 MG Tablet Therapy Pack UAD 1 Kit 0     No current facility-administered medications for this visit.        Social History   Substance Use Topics   • Smoking status: Never Smoker   • Smokeless tobacco: Never Used   • Alcohol use No     Social History     Social History Narrative   • No narrative on file       Family History   Problem Relation Age of Onset   • Hypertension Mother    • Cancer Maternal Aunt      bipolar   • Breast Cancer Maternal Aunt    • Cancer Maternal Grandmother      schizophrenia   • Hypertension Maternal Grandfather    • Diabetes Maternal Grandfather    • Heart Disease Maternal Grandfather    • No Known Problems Father    • Diabetes Paternal Grandfather         ROS:     - Constitutional:  Negative for fever, chills, unexpected weight change, and fatigue/generalized weakness.    - HEENT:  Negative for headaches, vision changes, hearing changes, ear pain, ear discharge, rhinorrhea, sinus congestion, sore throat, and neck pain.      - Respiratory: Negative for cough, sputum production, chest congestion, dyspnea, wheezing, and crackles.      - Cardiovascular: Negative for chest pain, palpitations, orthopnea, and bilateral lower extremity edema.     - Gastrointestinal:  "Negative for heartburn, nausea, vomiting, abdominal pain, hematochezia, melena, diarrhea, constipation, and greasy/foul-smelling stools.     - Genitourinary: Negative for dysuria, polyuria, hematuria, pyuria, urinary urgency, and urinary incontinence.     - Musculoskeletal: Positive as in history of present illness for low back pain and right SI joint pain.     - Skin: Negative for rash, itching, cyanotic skin color change.     - Neurological: Positive for right-sided sciatica that goes to the knee area. Patient denies saddle anesthesia, loss of a morning erection, loss of bowel or urine control.   - Endo/Heme/Allergies: Does not bruise/bleed easily.     - Psychiatric/Behavioral: Negative for depression, suicidal/homicidal ideation and memory loss.          - NOTE: All other systems reviewed and are negative, except as in HPI.      Exam:    Blood pressure 130/88, pulse 69, temperature 36.8 °C (98.2 °F), height 1.778 m (5' 10\"), weight (!) 126.8 kg (279 lb 8.7 oz), SpO2 98 %. Body mass index is 40.11 kg/m².    General:  Well nourished, well developed male seated leaning forward in the chair unable to lean back. Also walks in with a bent posture. Mildly shuffling gait because to walk normally is painful.  Patient was seen for 30 minutes face to face of which, 25 minutes was spent counseling regarding the above mentioned problems. And going over the informed consent for utilizing opiate pain medication. I elected not to do a urine drug screen today because patient has not had narcotics today or in the last 24 hours. Obtained and reviewed patient utilization report from [facility] [place] pharmacy database on 3/29/2018 4:29 PM  prior to writing prescription for controlled substance II, III or IV per [place] bill . Based on assessment of the report, the prescription is medically necessary.         Please note that this dictation was created using voice recognition software. I have made every reasonable attempt to correct " obvious errors, but I expect that there are errors of grammar and possibly content that I did not discover before finalizing the note.    Assessment/Plan:  1. Skin tag  We did not end up doing a skin tag removal because of having to deal with the acute back pain and being on a controlled substance.  - Consent for Surgery / Procedure    2. Acute right-sided low back pain with right-sided sciatica  Uncontrolled, I'm going to have the patient see Dr. Wil Anderson in consultation. I put in an urgent referral. Patient has had low back x-rays through [facility] and right hip x-rays through [facility]. I am not ordering an MRI until the patient has a chance to be seen by Dr. Anderson.  - oxyCODONE-acetaminophen (PERCOCET-10)  MG Tab; Take 1 Tab by mouth every 8 hours as needed for Severe Pain for up to 20 days. Must last for 20 days  Dispense: 60 Tab; Refill: 0  - CONTROLLED SUBSTANCE TREATMENT AGREEMENT  - CONSENT FOR OPIATE PRESCRIPTION  - REFERRAL TO PHYSIATRY (PMR)    3. Use of opiates for therapeutic purposes  Patient's back pain was uncontrolled with tramadol 100 mg twice a day, Medrol dose pack and anti-inflammatory along with rest. He then utilized some of his mother's Percocet 7.5/325. He states that if he is at a low level of pain 6/10 that medication will take the pain away for approximately 4 hours, but if his pain is 10 out of 10 that level of medication only brings him down to a 7/10. We discussed the many conditions affecting being able to be on Percocet and not operating a vehicle or any machinery while taking this medication. Discussed how highly addicting this medication is and that people will actually still this medication if he does not protect it. Patient will utilize the medication appropriately.   - oxyCODONE-acetaminophen (PERCOCET-10)  MG Tab; Take 1 Tab by mouth every 8 hours as needed for Severe Pain for up to 20 days. Must last for 20 days  Dispense: 60 Tab; Refill: 0  - CONTROLLED  SUBSTANCE TREATMENT AGREEMENT  - CONSENT FOR OPIATE PRESCRIPTION    4. Vitamin D deficiency  Uncontrolled, patient is now taking vitamin D 5000 units daily we will recheck in 3 months.    5. Hypertriglyceridemia  Uncontrolled, patient will work on weight loss and we will recheck this in 3 months    6. Prediabetes  Stable, patient will continue to work on weight loss which will help this chronic health problem.

## 2018-03-29 NOTE — ASSESSMENT & PLAN NOTE
This is a chronic health problem that is not adequately controlled. Patient's blood work shows that his total cholesterol is 167, triglycerides 173, HDL 32 and his LDL is upper limits of normal at 100. Patient will work on dietary changes. He has lost some weight but unfortunately has hurt his back by working out at the gym. We will need to follow with him.

## 2018-04-02 ENCOUNTER — TELEPHONE (OUTPATIENT)
Dept: MEDICAL GROUP | Facility: MEDICAL CENTER | Age: 23
End: 2018-04-02

## 2018-04-02 RX ORDER — METHOCARBAMOL 750 MG/1
750 TABLET, FILM COATED ORAL 3 TIMES DAILY
Qty: 90 TAB | Refills: 1 | Status: SHIPPED | OUTPATIENT
Start: 2018-04-02 | End: 2018-09-10 | Stop reason: SDUPTHER

## 2018-04-02 NOTE — TELEPHONE ENCOUNTER
VOICEMAIL  1. Caller Name: Peña Olson                        Call Back Number: [phone] (home)      2. Message: Patient's mom called stating that you were suppose to prescribe methocarbamol (ROBAXIN) 750 MG Tab? I did not see where this was ordered for patient. Was this medication supposed to be ordered or no?     Please let me know and I will call the patient back.     Thank you.    3. Patient approves office to leave a detailed voicemail/MyChart message: yes

## 2018-04-04 ENCOUNTER — TELEPHONE (OUTPATIENT)
Dept: PHYSICAL MEDICINE AND REHAB | Facility: MEDICAL CENTER | Age: 23
End: 2018-04-04

## 2018-04-04 NOTE — TELEPHONE ENCOUNTER
I called the patient and offered him an urgent appointment and offered to see him today 4/4/2018 however the patient declined urgent appointment and wishes to proceed with the previous scheduled appointment 4/12/2018.     Wil Anderson MD  Physical Medicine and Rehabilitation  Interventional Spine and Sports Physiatry  [facility]  4/4/2018 9:53 AM

## 2018-04-12 ENCOUNTER — OFFICE VISIT (OUTPATIENT)
Dept: PHYSICAL MEDICINE AND REHAB | Facility: MEDICAL CENTER | Age: 23
End: 2018-04-12
Payer: COMMERCIAL

## 2018-04-12 VITALS
HEART RATE: 107 BPM | HEIGHT: 70 IN | BODY MASS INDEX: 40.8 KG/M2 | TEMPERATURE: 98.2 F | DIASTOLIC BLOOD PRESSURE: 82 MMHG | WEIGHT: 285 LBS | SYSTOLIC BLOOD PRESSURE: 142 MMHG | OXYGEN SATURATION: 92 %

## 2018-04-12 DIAGNOSIS — Z78.9 IMPAIRED MOBILITY AND ADLS: ICD-10-CM

## 2018-04-12 DIAGNOSIS — R29.2 HYPOREFLEXIA: ICD-10-CM

## 2018-04-12 DIAGNOSIS — M54.16 LUMBAR RADICULOPATHY: ICD-10-CM

## 2018-04-12 DIAGNOSIS — Z74.09 IMPAIRED MOBILITY AND ADLS: ICD-10-CM

## 2018-04-12 DIAGNOSIS — R29.898 WEAKNESS OF RIGHT LOWER EXTREMITY: ICD-10-CM

## 2018-04-12 PROCEDURE — 99205 OFFICE O/P NEW HI 60 MIN: CPT | Performed by: PHYSICAL MEDICINE & REHABILITATION

## 2018-04-12 RX ORDER — GABAPENTIN 300 MG/1
600 CAPSULE ORAL 3 TIMES DAILY
Qty: 180 CAP | Refills: 6 | Status: SHIPPED | OUTPATIENT
Start: 2018-04-12 | End: 2018-05-01

## 2018-04-12 ASSESSMENT — PAIN SCALES - GENERAL: PAINLEVEL: 8=MODERATE-SEVERE PAIN

## 2018-04-12 NOTE — PROGRESS NOTES
New patient note    Physiatry (physical medicine and  Rehabilitation), interventional spine and sports medicine, Pain medicine    Date of Service: 4/12/2018    Chief complaint: low back pain    HISTORY    HPI: Peña Olson 22 y.o. male was originally lifting weights in late February when the patient immediately felt pain in the right low back radiating down the right leg. Pain is moderate to severe intensity gets up to 8/10 in intensity, aching type pain. Constant. Worse with sitting. Pain is improved when standing and active and worse with sitting. Positive for stiffness in the morning with difficulty bending.       Medical records review:  I reviewed the note from the referring provider Afua Kirk M.D. from 3/29/2018. Medrol Dosepak has been tried. Percocet tens of an tried. Pain is still not under control. Refer to physiatry. Obtained x-rays of the hips and lumbar spine..   I reviewed the notes from [facility] from Sb Wallis PA-C from 2/28/2018 patient was given naproxen for acute low back pain. Refer to neurosurgery.    Previous treatments:    Physical Therapy: No    Medications the patient is tried: NSAIDs, Narcotics, gabapentin, tylenol and muscle relaxers    Previous interventions: none    Previous surgeries to relieve the above pain:  none      ROS:   Red Flags ROS:   Fever, Chills, Sweats: Denies  Involuntary Weight Loss: Denies  Bladder Incontinence: Denies  Bowel Incontinence: denies  Saddle Anesthesia: Denies    All other systems reviewed and negative.       PMHx:   Past Medical History:   Diagnosis Date   • Acute right-sided low back pain with right-sided sciatica 3/29/2018    Acute injury lifting weights on 2/28/18   • Hypertension    • Hypertriglyceridemia 3/29/2018   • Inflamed skin tag 2/15/2018   • Use of opiates for therapeutic purposes 3/29/2018       PSHx:   History reviewed. No pertinent surgical history.    Family history   Family History   Problem  "Relation Age of Onset   • Hypertension Mother    • Cancer Maternal Aunt      bipolar   • Breast Cancer Maternal Aunt    • Cancer Maternal Grandmother      schizophrenia   • Hypertension Maternal Grandfather    • Diabetes Maternal Grandfather    • Heart Disease Maternal Grandfather    • No Known Problems Father    • Diabetes Paternal Grandfather          Medications:   Current Outpatient Prescriptions   Medication   • gabapentin (NEURONTIN) 300 MG Cap   • methocarbamol (ROBAXIN) 750 MG Tab   • oxyCODONE-acetaminophen (PERCOCET-10)  MG Tab   • naproxen (NAPROSYN) 500 MG Tab   • MethylPREDNISolone (MEDROL DOSEPAK) 4 MG Tablet Therapy Pack     No current facility-administered medications for this visit.        Allergies:   No Known Allergies    Social Hx:   Social History     Social History   • Marital status: Single     Spouse name: N/A   • Number of children: N/A   • Years of education: N/A     Occupational History   • Not on file.     Social History Main Topics   • Smoking status: Never Smoker   • Smokeless tobacco: Never Used   • Alcohol use No   • Drug use: No   • Sexual activity: No      Comment: single,  taxadermist     Other Topics Concern   •  Service No   • Blood Transfusions No   • Caffeine Concern No   • Occupational Exposure Yes   • Hobby Hazards Yes   • Sleep Concern Yes   • Stress Concern No   • Weight Concern Yes   • Special Diet No   • Back Care No   • Exercise Yes   • Bike Helmet No   • Seat Belt Yes   • Self-Exams No     Social History Narrative   • No narrative on file         EXAMINATION     Physical Exam:   Vitals: Blood pressure 142/82, pulse (!) 107, temperature 36.8 °C (98.2 °F), height 1.778 m (5' 10\"), weight (!) 129.3 kg (285 lb), SpO2 92 %.    Constitutional:   Body Habitus: Body mass index is 40.89 kg/m².  Cooperation: Fully cooperates with exam  Appearance: Well-groomed, well-nourished, not disheveled     Eyes: No scleral icterus to suggest severe liver disease, no proptosis " to suggest severe hyperthyroid    ENT -no obvious auditory deficits, no obvious tongue lesions, tongue midline, no facial droop     Skin -no rashes or lesions noted     Respiratory-  breathing comfortable on room air, no audible wheezing    Cardiovascular- capillary refills less than 2 seconds. No lower extremity edema is noted.     Gastrointestinal - no obvious abdominal masses, No tenderness to palpation in the abdomen    Psychiatric- alert and oriented ×3. Normal affect.     Gait - normal gait, no use of ambulatory device, nonantalgic.     Musculoskeletal -     Thoracic/Lumbar Spine/Sacral Spine/Hips   Inspection: No evidence of atrophy in bilateral lower extremities throughout     ROM: full  AROM with flexion, extension, lateral flexion, and rotation bilaterally, there is significant pain with lumbar flexion    Palpation:   No tenderness to palpation in midline at T1-T12 levels. No tenderness to palpation in the left and right of the midline T1-L5, POSITIVE for tenderness to palpation to the para-midline region in the lower lumbar levels.  palpation over SI joint: negative bilaterally    palpation over buttock: negative bilaterally    palpation in hip or over the greater trochanters: negative bilaterally      Lumbar spine Special tests  Neuro tension  Straight leg test positive right, negative left    Slump test positive right, negative left      HIP  FAIR test negative bilaterally    Range of motion in the hips is within normal limits in flexion, extension, abduction, internal rotation, external rotation.    SI joint tests  Observation patient sits on one buttocks: Negative  SI joint compression negative bilaterally    SI joint distraction negative bilaterally    Thigh thrust test negative bilaterally    DEB test negative bilaterally       Neuro       Key points for the international standards for neurological classification of spinal cord injury (ISNCSCI) to light touch.     Dermatome R L                                       L2 2 2   L3 2 2   L4 2 2   L5 2 2   S1 2 2   S2 2 2             Motor Exam Lower Extremities    ? Myotome R L   Hip flexion L2 5 5   Knee extension L3 5 5   Ankle dorsiflexion L4 5 5   Toe extension L5 4+ 5   Ankle plantarflexion S1 5 5           Tone on Modified Helen Scale    R L  R L   Shoulder Adduction Negative  Negative  Hip Flexion Negative  Negative    Elbow Flexion Negative  Negative  Hip Extension Negative  Negative    Elbow Extension Negative  Negative  Hip Adduction Negative  Negative    Wrist Flexion Negative  Negative  Knee Extension Negative  Negative    Finger Flexion Negative  Negative  Knee Flexion Negative  Negative       Dorsiflexion Negative  Negative       Plantar Flexion Negative  Negative      Babinski sign negative bilaterally   Clonus of the ankle negative bilaterally     Reflexes  ?  R L   Biceps  2+ 2+   Brachioradialis  2+ 2+   Patella  2+ 2+   Achilles   1+ 2+           MEDICAL DECISION MAKING    Medical records review: see under HPI section.     DATA    Labs:   Lab Results   Component Value Date/Time    SODIUM 142 02/21/2018 12:55 PM    POTASSIUM 4.0 02/21/2018 12:55 PM    CHLORIDE 107 02/21/2018 12:55 PM    CO2 26 02/21/2018 12:55 PM    ANION 9.0 02/21/2018 12:55 PM    GLUCOSE 81 02/21/2018 12:55 PM    BUN 13 02/21/2018 12:55 PM    CREATININE 0.83 02/21/2018 12:55 PM    CALCIUM 9.2 02/21/2018 12:55 PM    ASTSGOT 22 02/21/2018 12:55 PM    ALTSGPT 37 02/21/2018 12:55 PM    TBILIRUBIN 0.5 02/21/2018 12:55 PM    ALBUMIN 4.6 02/21/2018 12:55 PM    TOTPROTEIN 7.6 02/21/2018 12:55 PM    GLOBULIN 3.0 02/21/2018 12:55 PM    AGRATIO 1.5 02/21/2018 12:55 PM   ]    No results found for: PROTHROMBTM, INR     Lab Results   Component Value Date/Time    WBC 12.1 (H) 07/28/2017 12:26 PM    RBC 5.97 07/28/2017 12:26 PM    HEMOGLOBIN 16.6 07/28/2017 12:26 PM    HEMATOCRIT 50.2 07/28/2017 12:26 PM    MCV 84.1 07/28/2017 12:26 PM    MCH 27.8 07/28/2017 12:26 PM    MCHC 33.1 (L)  07/28/2017 12:26 PM    MPV 10.2 07/28/2017 12:26 PM    NEUTSPOLYS 56.50 07/28/2017 12:26 PM    LYMPHOCYTES 33.80 07/28/2017 12:26 PM    MONOCYTES 7.20 07/28/2017 12:26 PM    EOSINOPHILS 1.30 07/28/2017 12:26 PM    BASOPHILS 0.70 07/28/2017 12:26 PM    HYPOCHROMIA 1+ 10/09/2013 02:08 PM        Lab Results   Component Value Date/Time    HBA1C 5.9 (H) 02/21/2018 12:55 PM        Imaging: I personally reviewed following images, these are my reads  I personally reviewed right hip x-ray from 3/9/2018  No acute fractures. No signs of arthritis.    IMAGING radiology reads. I reviewed the following radiology reads                                                                                                       Hip x-ray 3/9/2018  No acute right hip fracture. If symptoms are persistent, would recommend consideration MRI for further evaluation.   Reading Provider Reading Date   Yovany Vieyra M.D. Mar 9, 2018       Diagnosis   Visit Diagnoses     ICD-10-CM   1. Lumbar radiculopathy M54.16   2. Impaired mobility and ADLs Z74.09   3. Hyporeflexia right S1 R29.2   4. Weakness of right lower extremity L5 R29.898           ASSESSMENT:  Peña Olson 22 y.o. male With acute onset low back pain starting in March 2018 with severe pain and impacting ADLs and function. The patient is failed medication management with NSAIDs, Tylenol, muscle relaxers, narcotics and continues to have significant pain even on Percocet 10 mg 3 times per day. The patient was taking gabapentin 300 mg twice a day with some improvement. He continues to have significant pain despite medical management and the pain is still severe 8/10 in intensity, constant with significant difficulty with sitting. Physical exam shows subtle weakness in a right L5 distribution with hyperreflexia and S1 distribution on the right when compared to the left these are consistent with her lower right lumbar radiculopathy likely secondary to a disc herniation.      Peña alexandre  seen today for new patient.    Diagnoses and all orders for this visit:    Lumbar radiculopathy  -     gabapentin (NEURONTIN) 300 MG Cap; Take 2 Caps by mouth 3 times a day.  -     MR-LUMBAR SPINE-W/O; Future    Impaired mobility and ADLs  -     MR-LUMBAR SPINE-W/O; Future    Hyporeflexia right S1  -     MR-LUMBAR SPINE-W/O; Future    Weakness of right lower extremity L5  -     MR-LUMBAR SPINE-W/O; Future      PLAN  Physical therapy: The patient is unlikely to tolerate physical therapy this time given significant pain with neurotension signs as well as with sitting.    Diagnostic workup: MRI lumbar spine as above. This is necessary as the patient is unlikely to tolerate physical therapy given physical exam and history. This is also an effort to prevent chronic opioid habituation in this patient.    Medications: We discussed tapering the patient off of Percocet over the next week. I increased the dose of gabapentin to 600 mg 3 times a day. Discontinue naproxen.    Interventional program: Interventional program will depend on the imaging findings above.      Outside records requested:  The patient signed outside records request form for his outside records including outside images. This was done include a lumbar spine x-rays done at [facility]      Follow-up: 2 weeks or sooner if the MRI is able to be done.         Please note that this dictation was created using voice recognition software. I have made every reasonable attempt to correct obvious errors but there may be errors of grammar and content that I may have overlooked prior to finalization of this note.      Wil Anderson MD  Physical Medicine and Rehabilitation  Interventional Spine and Sports Physiatry  [facility]       MICHAEL Kirk M.D.

## 2018-04-12 NOTE — Clinical Note
IZA Caal. I believe Sb has an acute disc herniation with right lower lumbar radiculopathy. I have ordered an urgent MRI for workup. I also discussed a taper off of Percocet with the patient. I increased his dose of gabapentin to 600 mg 3 times a day. I will follow-up with Sb after the MRI and we will discuss the results and I will show him the images  Wil

## 2018-04-26 ENCOUNTER — APPOINTMENT (OUTPATIENT)
Dept: PHYSICAL MEDICINE AND REHAB | Facility: MEDICAL CENTER | Age: 23
End: 2018-04-26
Payer: COMMERCIAL

## 2018-04-26 ENCOUNTER — HOSPITAL ENCOUNTER (OUTPATIENT)
Dept: RADIOLOGY | Facility: MEDICAL CENTER | Age: 23
End: 2018-04-26
Attending: PHYSICAL MEDICINE & REHABILITATION
Payer: COMMERCIAL

## 2018-04-26 DIAGNOSIS — M54.16 LUMBAR RADICULOPATHY: ICD-10-CM

## 2018-04-26 DIAGNOSIS — R29.898 WEAKNESS OF RIGHT LOWER EXTREMITY: ICD-10-CM

## 2018-04-26 DIAGNOSIS — Z74.09 IMPAIRED MOBILITY AND ADLS: ICD-10-CM

## 2018-04-26 DIAGNOSIS — R29.2 HYPOREFLEXIA: ICD-10-CM

## 2018-04-26 DIAGNOSIS — Z78.9 IMPAIRED MOBILITY AND ADLS: ICD-10-CM

## 2018-04-26 PROCEDURE — 72148 MRI LUMBAR SPINE W/O DYE: CPT

## 2018-05-01 ENCOUNTER — OFFICE VISIT (OUTPATIENT)
Dept: PHYSICAL MEDICINE AND REHAB | Facility: MEDICAL CENTER | Age: 23
End: 2018-05-01
Payer: COMMERCIAL

## 2018-05-01 VITALS
SYSTOLIC BLOOD PRESSURE: 128 MMHG | HEIGHT: 70 IN | WEIGHT: 283 LBS | DIASTOLIC BLOOD PRESSURE: 80 MMHG | OXYGEN SATURATION: 93 % | BODY MASS INDEX: 40.52 KG/M2 | TEMPERATURE: 97.6 F | HEART RATE: 88 BPM

## 2018-05-01 DIAGNOSIS — R29.898 WEAKNESS OF RIGHT LOWER EXTREMITY: ICD-10-CM

## 2018-05-01 DIAGNOSIS — R29.2 HYPOREFLEXIA: ICD-10-CM

## 2018-05-01 DIAGNOSIS — M54.16 LUMBAR RADICULOPATHY: ICD-10-CM

## 2018-05-01 DIAGNOSIS — M51.26 LUMBAR DISC HERNIATION: ICD-10-CM

## 2018-05-01 DIAGNOSIS — Z78.9 IMPAIRED MOBILITY AND ADLS: ICD-10-CM

## 2018-05-01 DIAGNOSIS — Z74.09 IMPAIRED MOBILITY AND ADLS: ICD-10-CM

## 2018-05-01 PROCEDURE — 99214 OFFICE O/P EST MOD 30 MIN: CPT | Performed by: PHYSICAL MEDICINE & REHABILITATION

## 2018-05-01 RX ORDER — MELOXICAM 7.5 MG/1
15 TABLET ORAL DAILY
Qty: 28 TAB | Refills: 0 | Status: SHIPPED | OUTPATIENT
Start: 2018-05-01 | End: 2018-05-31

## 2018-05-01 ASSESSMENT — PAIN SCALES - GENERAL: PAINLEVEL: 6=MODERATE PAIN

## 2018-05-01 NOTE — PROGRESS NOTES
Follow up patient note  Pain Medicine, Interventional spine and sports physiatry, Physical medicine rehabilitation      Chief complaint:   Chief Complaint   Patient presents with   • Follow-Up     Lumbar Radiculopathy          HISTORY    Please see new patient note dated 4/12/2018  by Dr Anderson,  for more details.     HPI  Patient identification: Peña Olson 22 y.o. male  With a right L5-S1 disc herniation and right L5 radiculopathy    Interval history:  The patient was last seen by me on 4/12/2018      In the interim the patient has had a mild improvement in his symptoms. He was unable to tolerate gabapentin because of the side effects which included drowsiness. He continues to have right low back pain radiating down the right leg, 6/10 in intensity, aching and shooting quality, constant. This is worse with sitting. Worse with lifting weights. The patient is an avid weight  and has difficulty with doing his normal exercise routine.       ROS Red Flags :   Fever, Chills, Sweats: Denies  Involuntary Weight Loss: Denies  Bowel/Bladder Incontinence: Denies  Saddle Anesthesia: Denies        PMHx:   Past Medical History:   Diagnosis Date   • Acute right-sided low back pain with right-sided sciatica 3/29/2018    Acute injury lifting weights on 2/28/18   • Hypertension    • Hypertriglyceridemia 3/29/2018   • Inflamed skin tag 2/15/2018   • Use of opiates for therapeutic purposes 3/29/2018       PSHx:   History reviewed. No pertinent surgical history.    Family history   Denies neuromuscular disease  Family History   Problem Relation Age of Onset   • Hypertension Mother    • Cancer Maternal Aunt      bipolar   • Breast Cancer Maternal Aunt    • Cancer Maternal Grandmother      schizophrenia   • Hypertension Maternal Grandfather    • Diabetes Maternal Grandfather    • Heart Disease Maternal Grandfather    • No Known Problems Father    • Diabetes Paternal Grandfather          Medications:   Current  "Outpatient Prescriptions   Medication   • meloxicam (MOBIC) 7.5 MG Tab   • methocarbamol (ROBAXIN) 750 MG Tab     No current facility-administered medications for this visit.        Allergies:   No Known Allergies    Social Hx:   Social History     Social History   • Marital status: Single     Spouse name: N/A   • Number of children: N/A   • Years of education: N/A     Occupational History   • Not on file.     Social History Main Topics   • Smoking status: Never Smoker   • Smokeless tobacco: Never Used   • Alcohol use No   • Drug use: No   • Sexual activity: No      Comment: single,  taxadermist     Other Topics Concern   •  Service No   • Blood Transfusions No   • Caffeine Concern No   • Occupational Exposure Yes   • Hobby Hazards Yes   • Sleep Concern Yes   • Stress Concern No   • Weight Concern Yes   • Special Diet No   • Back Care No   • Exercise Yes   • Bike Helmet No   • Seat Belt Yes   • Self-Exams No     Social History Narrative   • No narrative on file         EXAMINATION     Physical Exam:   Vitals: Blood pressure 128/80, pulse 88, temperature 36.4 °C (97.6 °F), height 1.778 m (5' 10\"), weight (!) 128.4 kg (283 lb), SpO2 93 %.    Constitutional:   Body Habitus: Body mass index is 40.61 kg/m².  Cooperation: Fully cooperates with exam  Appearance: Well-groomed no disheveled     Respiratory-  breathing comfortable on room air, no audible wheezing  Cardiovascular- capillary refills less than 2 seconds. No lower extremity edema is noted.   Psychiatric- alert and oriented ×3. Normal affect.   Spine: Extension rotation maneuver is negative. Slump test and slight straight leg raise test are positive on the right and negative on the left. No tennis palpation throughout the lumbar spine. Reflexes are 1+ in the right Achilles, 2+ left Achilles, 2+ bilateral patella    Motor Exam Lower Extremities     ? Myotome R L   Hip flexion L2 5 5   Knee extension L3 5 5   Ankle dorsiflexion L4 5 5   Toe extension L5 4+ 5 "   Ankle plantarflexion S1 5 5               MEDICAL DECISION MAKING    DATA    Labs:   Lab Results   Component Value Date/Time    SODIUM 142 02/21/2018 12:55 PM    POTASSIUM 4.0 02/21/2018 12:55 PM    CHLORIDE 107 02/21/2018 12:55 PM    CO2 26 02/21/2018 12:55 PM    GLUCOSE 81 02/21/2018 12:55 PM    BUN 13 02/21/2018 12:55 PM    CREATININE 0.83 02/21/2018 12:55 PM        No results found for: PROTHROMBTM, INR     Lab Results   Component Value Date/Time    WBC 12.1 (H) 07/28/2017 12:26 PM    RBC 5.97 07/28/2017 12:26 PM    HEMOGLOBIN 16.6 07/28/2017 12:26 PM    HEMATOCRIT 50.2 07/28/2017 12:26 PM    MCV 84.1 07/28/2017 12:26 PM    MCH 27.8 07/28/2017 12:26 PM    MCHC 33.1 (L) 07/28/2017 12:26 PM    MPV 10.2 07/28/2017 12:26 PM    NEUTSPOLYS 56.50 07/28/2017 12:26 PM    LYMPHOCYTES 33.80 07/28/2017 12:26 PM    MONOCYTES 7.20 07/28/2017 12:26 PM    EOSINOPHILS 1.30 07/28/2017 12:26 PM    BASOPHILS 0.70 07/28/2017 12:26 PM    HYPOCHROMIA 1+ 10/09/2013 02:08 PM        Lab Results   Component Value Date/Time    HBA1C 5.9 (H) 02/21/2018 12:55 PM          Imaging: I personally reviewed following images  MRI lumbar spine dated 4/26/2018  L5-S1 central to right paracentric disc protrusion with mild right neuroforaminal stenosis which abuts the right L5 nerve root.      I reviewed the following radiology reports                                        Results for orders placed during the hospital encounter of 04/26/18   MR-LUMBAR SPINE-W/O    Impression L5-S1 central disc protrusion and mild bilateral facet degeneration. There is no central canal narrowing. There is mild bilateral neuroforaminal narrowing.                                                              DIAGNOSIS   Visit Diagnoses     ICD-10-CM   1. Lumbar disc herniation L5-S1 M51.26   2. Lumbar radiculopathy M54.16   3. Impaired mobility and ADLs Z74.09   4. Hyporeflexia right S1 R29.2   5. Weakness of right lower extremity L5 R29.898         ASSESSMENT and PLAN:      Peñahimanshu Ragsdale Wesley 22 y.o. Male     Peña was seen today for follow-up.    Diagnoses and all orders for this visit:    Lumbar disc herniation L5-S1  -     meloxicam (MOBIC) 7.5 MG Tab; Take 2 Tabs by mouth every day for 30 days. For 30 days then stop. Do not take other NSAIDs. No refills.  -     REFERRAL TO PHYSICAL THERAPY Reason for Therapy: Eval/Treat/Report    Lumbar radiculopathy  -     meloxicam (MOBIC) 7.5 MG Tab; Take 2 Tabs by mouth every day for 30 days. For 30 days then stop. Do not take other NSAIDs. No refills.  -     REFERRAL TO PHYSICAL THERAPY Reason for Therapy: Eval/Treat/Report    Impaired mobility and ADLs  -     meloxicam (MOBIC) 7.5 MG Tab; Take 2 Tabs by mouth every day for 30 days. For 30 days then stop. Do not take other NSAIDs. No refills.  -     REFERRAL TO PHYSICAL THERAPY Reason for Therapy: Eval/Treat/Report    Hyporeflexia right S1  -     meloxicam (MOBIC) 7.5 MG Tab; Take 2 Tabs by mouth every day for 30 days. For 30 days then stop. Do not take other NSAIDs. No refills.    Weakness of right lower extremity L5  -     meloxicam (MOBIC) 7.5 MG Tab; Take 2 Tabs by mouth every day for 30 days. For 30 days then stop. Do not take other NSAIDs. No refills.    Subtle weakness    PLAN  Physical therapy: I ordered physical therapy to focus on strengthening and stretching.     home exercise program: I encouraged regular strengthening and stretching with a home exercise program     Medications: I recommend avoiding narcotic medications in this patient.   mobic as above.     Interventional program: We discussed a right L5-S1 transforaminal epidural steroid injection, the patient wishes to go to physical therapy 1st and we will consider an injection should the patient fail medical management and physical therapy.       Follow up: 2 month    Thank you for allowing me to participate in the care of this patient. If you have any questions please not hesitate to contact me.        Please note  that this dictation was created using voice recognition software. I have made every reasonable attempt to correct obvious errors but there may be errors of grammar and content that I may have overlooked prior to finalization of this note.      Wli Anderson MD  Interventional Spine and Sports Physiatry  Physical Medicine and Rehabilitation  [facility]  5/1/2018 1:48 PM

## 2018-05-17 ENCOUNTER — OFFICE VISIT (OUTPATIENT)
Dept: MEDICAL GROUP | Facility: MEDICAL CENTER | Age: 23
End: 2018-05-17
Payer: COMMERCIAL

## 2018-05-17 VITALS
BODY MASS INDEX: 40.52 KG/M2 | HEART RATE: 69 BPM | WEIGHT: 283 LBS | RESPIRATION RATE: 14 BRPM | HEIGHT: 70 IN | TEMPERATURE: 98.2 F | OXYGEN SATURATION: 98 % | DIASTOLIC BLOOD PRESSURE: 98 MMHG | SYSTOLIC BLOOD PRESSURE: 130 MMHG

## 2018-05-17 DIAGNOSIS — L91.8 ACQUIRED SKIN TAG: ICD-10-CM

## 2018-05-17 PROCEDURE — 11200 RMVL SKIN TAGS UP TO&INC 15: CPT | Performed by: FAMILY MEDICINE

## 2018-05-17 NOTE — PROGRESS NOTES
SUBJECTIVE:   Peña Olson is a 22 y.o. male who presents for lesion removal. We have already discussed this procedure, including option of not performing surgery, technique of surgery and potential for scarring at a recent visit.    OBJECTIVE:   Patient appears well. Vitals are normal.  Skin: Patient has large skin tag present in the center of his back measuring approximately 1.2 cm in height and 0.75 cm across the base.  It is excoriated from rubbing on his clothing.    ASSESSMENT:   normal complete skin exam, no suspicious lesions, skin tag    PLAN:   After informed consent was obtained, using Betadine for cleansing and 1% Lidocaine with epinephrine for anesthetic, with sterile technique, shave excision was performed. Antibiotic dressing is applied, and wound care instructions provided.  Be alert for any signs of cutaneous infection. The procedure was well tolerated without complications. Follow up: the patient may return prn    Patient cautioned to watch the wound to make sure there is no signs of infection.  Patient can also shower tonight because the base was cauterized and should not bleed.  Triple antibiotic ointment was placed and the lesion was bandaged..

## 2018-05-23 ENCOUNTER — PHYSICAL THERAPY (OUTPATIENT)
Dept: PHYSICAL THERAPY | Facility: REHABILITATION | Age: 23
End: 2018-05-23
Attending: PHYSICAL MEDICINE & REHABILITATION
Payer: COMMERCIAL

## 2018-05-23 DIAGNOSIS — M54.31 BACK PAIN WITH RIGHT-SIDED SCIATICA: ICD-10-CM

## 2018-05-23 PROCEDURE — 97110 THERAPEUTIC EXERCISES: CPT

## 2018-05-23 PROCEDURE — 97161 PT EVAL LOW COMPLEX 20 MIN: CPT

## 2018-05-23 PROCEDURE — 97012 MECHANICAL TRACTION THERAPY: CPT

## 2018-05-23 ASSESSMENT — ENCOUNTER SYMPTOMS
PAIN TIMING: INTERMITTENT
PAIN SCALE AT HIGHEST: 7
PAIN TIMING: SPORADIC
PAIN SCALE AT LOWEST: 0
PAIN SCALE: 5
QUALITY: SHARP
QUALITY: PRESSURE
QUALITY: BURNING

## 2018-05-23 NOTE — OP THERAPY EVALUATION
Outpatient Physical Therapy  INITIAL EVALUATION    [facility] Physical Therapy [address].  [address]  [place] [address]  Phone:  [phone]  Fax:  [phone]    Date of Evaluation: 2018    Patient: Peña Olson  YOB: 1995  MRN: [id number]     Referring Provider: Wil Anedrson M.D.  [address]  [address]  [place] [address]   Referring Diagnosis Lumbar disc herniation [M51.26];Lumbar radiculopathy [M54.16];Impaired mobility and ADLs [Z74.09]     Time Calculation  Start time: 830  Stop time: 945 Time Calculation (min): 75 minutes     Physical Therapy Occurrence Codes    Date of onset of impairment:  18   Date physical therapy care plan established or reviewed:  18   Date physical therapy treatment started:  18          Chief Complaint: Back Pain    Visit Diagnoses     ICD-10-CM   1. Back pain with right-sided sciatica M54.31         Subjective   History of Present Illness:     History of chief complaint:  Onset of LBP approx 2 months ago after doing dead lifts at the gym. Pt reports R-sided sxs and intermittent pain R knee up to 3x/day.    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    Objective   Observation and functional movement:  Increased T/S kyphosis, diminished L/S lordosis, bilateral scapular protraction/abduction    Range of motion and strength:    Strength is within functional limits.    L/S AROM:  Flex = FT to knees  Ext = WFL  SB = 75% bilaterally    Sensation and reflexes:     Sensation is intact.      Reflexes are normal and symmetrical.    Palpation and joint mobility:     TTP central L4-S1, R > L L5/S1  Hypomobile L5/S1 and TL jct    Special tests:      SLR = 40 degrees bilaterally w/increased HS pull w/DF, R > L          Therapeutic Exercises (CPT 09248):     1. HEP, prone lying, press ups    Therapeutic Treatments and Modalities:     1. Mechanical Traction (CPT 43858), L/S w/MHP, 100/60#, 60/20s, x15 min // pt reports an episode of sharpness after trying to reach OH and adjust pillow, resolved shortly after    Time-based treatments/modalities:  Therapeutic exercise minutes (CPT 82349): 10 minutes       Assessment, Response and Plan:   Assessment details:  22 yr old male presents w/subacute LBP and LE sxs after wt lifting 3 months ago. Sxs consistent w/mild L5/S1 radiculitis and disc derangement. Skilled PT indicated to address the following goals.    Goals:   Short Term Goals:   Able to sit up to 30 min w/o increased c/o LBP  Pt to report 50% decreased episodes of knee sxs  Pt to return to modified gym routine w/o increased c/o LBP  Short term goal time span:  2-4 weeks      Long Term Goals:    Able to sit as needed w/o increased c/o LBP  Pt to report resolution of knee sxs  Pt to return to regular  gym routine w/proper form and w/o increased c/o LBP  Long term goal time span:  6-8 weeks    Plan:   Therapy options:  Physical therapy treatment to continue  Planned therapy interventions:  E Stim Unattended (CPT 15380), Mechanical Traction (CPT 82563), Manual Therapy (CPT 87030), Therapeutic Exercise (CPT 21219) and Neuromuscular Re-education (CPT 41876)  Frequency:  2x week  Duration in weeks:  6  Plan details:  Cont skilled PT to address proper lifting mechanics and deep L/S stability.       Functional Limitation G-Codes and Severity Modifiers  Juan Jason Low Back Pain and Disability Score: 8.33     Referring provider co-signature:  I have reviewed this plan of care and my co-signature certifies the need for services.      Physician Signature: ________________________________ Date: ______________

## 2018-05-30 ENCOUNTER — PHYSICAL THERAPY (OUTPATIENT)
Dept: PHYSICAL THERAPY | Facility: REHABILITATION | Age: 23
End: 2018-05-30
Attending: PHYSICAL MEDICINE & REHABILITATION
Payer: COMMERCIAL

## 2018-05-30 DIAGNOSIS — M54.31 BACK PAIN WITH RIGHT-SIDED SCIATICA: ICD-10-CM

## 2018-05-30 PROCEDURE — 97014 ELECTRIC STIMULATION THERAPY: CPT

## 2018-05-30 PROCEDURE — 97110 THERAPEUTIC EXERCISES: CPT

## 2018-05-30 NOTE — OP THERAPY DAILY TREATMENT
Outpatient Physical Therapy  DAILY TREATMENT     [facility] Physical [address]  [address]  [place] [address]  Phone:  [phone]  Fax:  [phone]    Date: 05/30/2018    Patient: Peña Olson  YOB: 1995  MRN: [id number]     Time Calculation  Start time: 1535  Stop time: 1620 Time Calculation (min): 45 minutes     Chief Complaint: low back pain   Visit #: 2    SUBJECTIVE:  Patient reports he is continuing to report stiffness intermittently. He does report improvement with frequency of LE sxs. He did not like the traction. Prone press up appear to be helping.     OBJECTIVE:  Current objective measures:        Therapeutic Exercises (CPT 60037):     1. Foam roller , pec stretch     2. Foam roller , alternating progressing to bilateral UE flexion x 10     3. Foam roller, demonstrated BKFO and marching for TrA activation     4. Stabilization cuff , TrA recruitment x 8     5. Stabilization cuff , BKFO x 10, difficulty sustaining TrA stabilization, bias into flexion and ext     6. Stabilization cuff , marching x 10 each LE, difficulty sustaining TrA stabilization, bias into flexion and ext     Therapeutic Treatments and Modalities:     1. E Stim Unattended (CPT 20191), IFC and heat lumbar spine 15 minutes     2. Manual Therapy (CPT 67765), IASTM QL and paraspinals     Time-based treatments/modalities:  Manual therapy minutes (CPT 83074): 5 minutes  Therapeutic exercise minutes (CPT 52333): 25 minutes       Pain rating before treatment: 4-5, low back with increased sxs to R side.   Pain rating after treatment: 3-4, low back pain     ASSESSMENT:   Response to treatment: Patient tolerated treatment well, but patient at times resistant to how PT can help him. Patient does demonstrate poor TrA activation and poor hip disassociation.     PLAN/RECOMMENDATIONS:   Plan for treatment: therapy treatment to continue next visit. Continue with lumbar stabilization and posterior  chain strengthening.  Planned interventions for next visit: continue with current treatment.

## 2018-06-01 ENCOUNTER — APPOINTMENT (OUTPATIENT)
Dept: PHYSICAL THERAPY | Facility: REHABILITATION | Age: 23
End: 2018-06-01
Attending: PHYSICAL MEDICINE & REHABILITATION
Payer: COMMERCIAL

## 2018-06-04 ENCOUNTER — PHYSICAL THERAPY (OUTPATIENT)
Dept: PHYSICAL THERAPY | Facility: REHABILITATION | Age: 23
End: 2018-06-04
Attending: PHYSICAL MEDICINE & REHABILITATION
Payer: COMMERCIAL

## 2018-06-04 DIAGNOSIS — M54.31 BACK PAIN WITH RIGHT-SIDED SCIATICA: ICD-10-CM

## 2018-06-04 PROCEDURE — 97012 MECHANICAL TRACTION THERAPY: CPT

## 2018-06-04 PROCEDURE — 97110 THERAPEUTIC EXERCISES: CPT

## 2018-06-04 NOTE — OP THERAPY DAILY TREATMENT
Outpatient Physical Therapy  DAILY TREATMENT     [facility] Physical [address].  [address]  [place] [address]  Phone:  [phone]  Fax:  [phone]    Date: 06/04/2018    Patient: Peña Olson  YOB: 1995  MRN: [id number]     Time Calculation  Start time: 1030  Stop time: 1120 Time Calculation (min): 50 minutes     Chief Complaint: low back pain     Visit #: 3    SUBJECTIVE:  Patient reports prone presses still seem the most helpful, e-stim didn't seem to help at all. Presents w/mild kneecap ache    OBJECTIVE:      Therapeutic Exercises (CPT 08719):     1. Prone lying on 45 deg elevated table, x5 min    2. Wall ball squats, x20    3. Supine ext over ball, x20    4. Ski jump, 3x1 min    5. Ball curls w/cuff, x5 min    Therapeutic Treatments and Modalities:     1. Mechanical Traction (CPT 70283), L/S w/MHP, 100/60#, 60/20s, // removed heat at detention point per pt preference    Time-based treatments/modalities:  Therapeutic exercise minutes (CPT 61874): 30 minutes       Pain rating before treatment: 1-2, at knee and L/S  Pain rating after treatment: 4, only L/S    ASSESSMENT:   Pt w/valsalva tendency, responded well to ext biased exercises, no c/o increased back or LE sxs throughout ther ex.    PLAN/RECOMMENDATIONS:   Plan for treatment: therapy treatment to continue next visit. Continue with lumbar stabilization and posterior chain strengthening.

## 2018-06-06 ENCOUNTER — PHYSICAL THERAPY (OUTPATIENT)
Dept: PHYSICAL THERAPY | Facility: REHABILITATION | Age: 23
End: 2018-06-06
Attending: PHYSICAL MEDICINE & REHABILITATION
Payer: COMMERCIAL

## 2018-06-06 DIAGNOSIS — M54.31 BACK PAIN WITH RIGHT-SIDED SCIATICA: ICD-10-CM

## 2018-06-06 PROCEDURE — 97110 THERAPEUTIC EXERCISES: CPT

## 2018-06-06 PROCEDURE — 97012 MECHANICAL TRACTION THERAPY: CPT

## 2018-06-06 NOTE — OP THERAPY DAILY TREATMENT
Outpatient Physical Therapy  DAILY TREATMENT     [facility] Physical [address].  [address]  [place] [address]  Phone:  [phone]  Fax:  [phone]    Date: 06/06/2018    Patient: Peña Olson  YOB: 1995  MRN: [id number]     Time Calculation  Start time: 1030  Stop time: 1120 Time Calculation (min): 50 minutes     Chief Complaint: low back pain     Visit #: Visit count could not be calculated. Make sure you are using a visit which is associated with an episode.    SUBJECTIVE:  Patient reports no pain since last session.    OBJECTIVE:    Therapeutic Exercises (CPT 96000):     1. Multifidi sidestep w/greeen TB, x5 ea    2. Wall ball squats, x30    3. Supine ext over ball, x3 min    4. Ski jump, alt UE, superman, 3x1 min    5. Ball curls w/cuff, x5 min    Therapeutic Treatments and Modalities:     1. Mechanical Traction (CPT 46737), L/S, 100/60#, 60/20s    Time-based treatments/modalities:  Therapeutic exercise minutes (CPT 75286): 30 minutes       Pain rating before treatment: 0  Pain rating after treatment: 0    ASSESSMENT:   Cont w/poor postural endurance/habits. Demos centralization of sxs w/ext biased exercises.    PLAN/RECOMMENDATIONS:   D/C to HEP per pt request

## 2018-06-06 NOTE — OP THERAPY DISCHARGE SUMMARY
Outpatient Physical Therapy  DISCHARGE SUMMARY NOTE      [facility] Physical Therapy [address]  [address]  [place] [address]  Phone:  [phone]  Fax:  [phone]    Date of Visit: 06/06/2018    Patient: Peña Olson  YOB: 1995  MRN: [id number]     Referring Provider: Wil Anderson M.D.  [address]  [address]  [place] [address]   Referring Diagnosis Cervicalgia [M54.2];Other reduced mobility [Z74.09]     Physical Therapy Occurrence Codes    Date of onset of impairment:  2/28/18   Date physical therapy care plan established or reviewed:  5/23/18   Date physical therapy treatment started:  5/23/18        Your patient is being discharged from Physical Therapy with the following comments:   · Goals met    Recommendations:  Pt continues w/poor postural tendencies and flexion biased recreational and work activities. Pt responded well to extension biased ther ex and will benefit in the future incorporating into HEP.    Yina Tejada, PT, DPT    Date: 6/6/2018

## 2018-07-03 ENCOUNTER — OFFICE VISIT (OUTPATIENT)
Dept: PHYSICAL MEDICINE AND REHAB | Facility: MEDICAL CENTER | Age: 23
End: 2018-07-03
Payer: COMMERCIAL

## 2018-07-03 VITALS
HEIGHT: 68 IN | HEART RATE: 95 BPM | BODY MASS INDEX: 43.95 KG/M2 | WEIGHT: 290 LBS | TEMPERATURE: 97.7 F | DIASTOLIC BLOOD PRESSURE: 80 MMHG | SYSTOLIC BLOOD PRESSURE: 118 MMHG | OXYGEN SATURATION: 95 %

## 2018-07-03 DIAGNOSIS — R29.898 WEAKNESS OF RIGHT LOWER EXTREMITY: ICD-10-CM

## 2018-07-03 DIAGNOSIS — M54.16 LUMBAR RADICULOPATHY: ICD-10-CM

## 2018-07-03 DIAGNOSIS — M51.26 LUMBAR DISC HERNIATION: ICD-10-CM

## 2018-07-03 PROCEDURE — 99214 OFFICE O/P EST MOD 30 MIN: CPT | Performed by: PHYSICAL MEDICINE & REHABILITATION

## 2018-07-03 RX ORDER — GLUCOSAMINE HCL 500 MG
TABLET ORAL 2 TIMES DAILY
COMMUNITY

## 2018-07-03 ASSESSMENT — PAIN SCALES - GENERAL: PAINLEVEL: NO PAIN

## 2018-07-03 NOTE — PROGRESS NOTES
Follow up patient note  Pain Medicine, Interventional spine and sports physiatry, Physical medicine rehabilitation      Chief complaint:   Chief Complaint   Patient presents with   • Follow-Up     Lumbar radiculopathy          HISTORY    Please see new patient note dated 4/12/2018  by Dr Anderson,  for more details.     HPI  Patient identification: Peña Olson 22 y.o. male  With a right L5-S1 disc herniation and right L5 radiculopathy    Interval history:    In the interim the patient is a significant improvement in his symptoms and now has large periods of time with no pain following physical therapy and oral anti-inflammatories.  Patient has completed his oral anti-inflammatory with meloxicam and he is compliant with his home exercise program of stretching and core strengthening exercises.  He does have intermittent flares with moderate to severe intensity of low back pain radiating down the posterior lateral aspect of the right leg worse with sitting worse with stretching forward however this is not as consistent as it was before.          ROS Red Flags :   Fever, Chills, Sweats: Denies  Involuntary Weight Loss: Denies  Bowel/Bladder Incontinence: Denies  Saddle Anesthesia: Denies        PMHx:   Past Medical History:   Diagnosis Date   • Acute right-sided low back pain with right-sided sciatica 3/29/2018    Acute injury lifting weights on 2/28/18   • Hypertension    • Hypertriglyceridemia 3/29/2018   • Inflamed skin tag 2/15/2018   • Use of opiates for therapeutic purposes 3/29/2018       PSHx:   History reviewed. No pertinent surgical history.    Family history   Denies neuromuscular disease  Family History   Problem Relation Age of Onset   • Hypertension Mother    • Cancer Maternal Aunt      bipolar   • Breast Cancer Maternal Aunt    • Cancer Maternal Grandmother      schizophrenia   • Hypertension Maternal Grandfather    • Diabetes Maternal Grandfather    • Heart Disease Maternal Grandfather    • No  "Known Problems Father    • Diabetes Paternal Grandfather          Medications:   Current Outpatient Prescriptions   Medication   • MELATONIN PO   • Cholecalciferol (VITAMIN D3) 3000 units Tab   • methocarbamol (ROBAXIN) 750 MG Tab     No current facility-administered medications for this visit.        Allergies:   No Known Allergies    Social Hx:   Social History     Social History   • Marital status: Single     Spouse name: N/A   • Number of children: N/A   • Years of education: N/A     Occupational History   • Not on file.     Social History Main Topics   • Smoking status: Never Smoker   • Smokeless tobacco: Never Used   • Alcohol use No   • Drug use: No   • Sexual activity: No      Comment: single,  taxadermist     Other Topics Concern   •  Service No   • Blood Transfusions No   • Caffeine Concern No   • Occupational Exposure Yes   • Hobby Hazards Yes   • Sleep Concern Yes   • Stress Concern No   • Weight Concern Yes   • Special Diet No   • Back Care No   • Exercise Yes   • Bike Helmet No   • Seat Belt Yes   • Self-Exams No     Social History Narrative   • No narrative on file         EXAMINATION     Physical Exam:   Vitals: Blood pressure 118/80, pulse 95, temperature 36.5 °C (97.7 °F), height 1.727 m (5' 8\"), weight (!) 131.5 kg (290 lb), SpO2 95 %.    Constitutional:   Body Habitus: Body mass index is 44.09 kg/m².  Cooperation: Fully cooperates with exam  Appearance: Well-groomed no disheveled     Respiratory-  breathing comfortable on room air, no audible wheezing  Cardiovascular- capillary refills less than 2 seconds. No lower extremity edema is noted.   Psychiatric- alert and oriented ×3. Normal affect.   Spine: Extension rotation maneuver is negative.  Slump test and straight leg raise test are negative bilaterally. Strength 5/5 BL.       MEDICAL DECISION MAKING    DATA    Labs:   Lab Results   Component Value Date/Time    SODIUM 142 02/21/2018 12:55 PM    POTASSIUM 4.0 02/21/2018 12:55 PM    " CHLORIDE 107 02/21/2018 12:55 PM    CO2 26 02/21/2018 12:55 PM    GLUCOSE 81 02/21/2018 12:55 PM    BUN 13 02/21/2018 12:55 PM    CREATININE 0.83 02/21/2018 12:55 PM        No results found for: PROTHROMBTM, INR     Lab Results   Component Value Date/Time    WBC 12.1 (H) 07/28/2017 12:26 PM    RBC 5.97 07/28/2017 12:26 PM    HEMOGLOBIN 16.6 07/28/2017 12:26 PM    HEMATOCRIT 50.2 07/28/2017 12:26 PM    MCV 84.1 07/28/2017 12:26 PM    MCH 27.8 07/28/2017 12:26 PM    MCHC 33.1 (L) 07/28/2017 12:26 PM    MPV 10.2 07/28/2017 12:26 PM    NEUTSPOLYS 56.50 07/28/2017 12:26 PM    LYMPHOCYTES 33.80 07/28/2017 12:26 PM    MONOCYTES 7.20 07/28/2017 12:26 PM    EOSINOPHILS 1.30 07/28/2017 12:26 PM    BASOPHILS 0.70 07/28/2017 12:26 PM    HYPOCHROMIA 1+ 10/09/2013 02:08 PM        Lab Results   Component Value Date/Time    HBA1C 5.9 (H) 02/21/2018 12:55 PM          Imaging: I personally reviewed following images  MRI lumbar spine dated 4/26/2018  L5-S1 central to right paracentric disc protrusion with mild right neuroforaminal stenosis which abuts the right L5 nerve root.      I reviewed the following radiology reports                                        Results for orders placed during the hospital encounter of 04/26/18   MR-LUMBAR SPINE-W/O    Impression L5-S1 central disc protrusion and mild bilateral facet degeneration. There is no central canal narrowing. There is mild bilateral neuroforaminal narrowing.                                                              DIAGNOSIS   Visit Diagnoses     ICD-10-CM   1. Lumbar disc herniation L5-S1 M51.26   2. Lumbar radiculopathy M54.16   3. Weakness of right lower extremity L5 R29.898         ASSESSMENT and PLAN:     Peña Olson 22 y.o. Male with improvement in his low back pain rating on the right leg secondary lumbar radiculopathy and disc herniation.  He has been compliant with his home exercise program physical therapy, status post meloxicam treatment.  Symptoms  have improved so I do not feel that an epidural is necessary at this time.  We discussed that the patient does have a recurrence of his pain and I would consider him for a right L5-S1 transforaminal epidural steroid injection with fluoroscopic guidance.    Peña was seen today for follow-up.    Diagnoses and all orders for this visit:    Lumbar disc herniation L5-S1  Comments:  stable    Lumbar radiculopathy  Comments:  stable    Weakness of right lower extremity L5  Comments:  stable        Follow up: 6 months    Thank you for allowing me to participate in the care of this patient. If you have any questions please not hesitate to contact me.        Please note that this dictation was created using voice recognition software. I have made every reasonable attempt to correct obvious errors but there may be errors of grammar and content that I may have overlooked prior to finalization of this note.      Wil Anderson MD  Interventional Spine and Sports Physiatry  Physical Medicine and Rehabilitation  [facility]  7/3/2018  1:49 PM

## 2018-07-30 ENCOUNTER — NON-PROVIDER VISIT (OUTPATIENT)
Dept: OCCUPATIONAL MEDICINE | Facility: CLINIC | Age: 23
End: 2018-07-30

## 2018-07-30 DIAGNOSIS — Z02.1 PRE-EMPLOYMENT DRUG SCREENING: ICD-10-CM

## 2018-07-30 DIAGNOSIS — Z02.1 DRUG TESTING, PRE-EMPLOYMENT: ICD-10-CM

## 2018-07-30 LAB
AMP AMPHETAMINE: NORMAL
COC COCAINE: NORMAL
INT CON NEG: NORMAL
INT CON POS: NORMAL
MET METHAMPHETAMINES: NORMAL
OPI OPIATES: NORMAL
PCP PHENCYCLIDINE: NORMAL
POC DRUG COMMENT 753798-OCCUPATIONAL HEALTH: NEGATIVE
THC: NORMAL

## 2018-07-30 PROCEDURE — 80305 DRUG TEST PRSMV DIR OPT OBS: CPT | Performed by: PREVENTIVE MEDICINE

## 2018-08-27 ENCOUNTER — TELEPHONE (OUTPATIENT)
Dept: PHYSICAL MEDICINE AND REHAB | Facility: MEDICAL CENTER | Age: 23
End: 2018-08-27

## 2018-08-27 ENCOUNTER — HOSPITAL ENCOUNTER (OUTPATIENT)
Dept: RADIOLOGY | Facility: MEDICAL CENTER | Age: 23
End: 2018-08-27
Attending: PHYSICAL MEDICINE & REHABILITATION
Payer: COMMERCIAL

## 2018-08-27 ENCOUNTER — OFFICE VISIT (OUTPATIENT)
Dept: PHYSICAL MEDICINE AND REHAB | Facility: MEDICAL CENTER | Age: 23
End: 2018-08-27
Payer: COMMERCIAL

## 2018-08-27 VITALS
HEART RATE: 85 BPM | SYSTOLIC BLOOD PRESSURE: 108 MMHG | OXYGEN SATURATION: 93 % | DIASTOLIC BLOOD PRESSURE: 70 MMHG | HEIGHT: 70 IN | BODY MASS INDEX: 41.88 KG/M2 | TEMPERATURE: 97.1 F | WEIGHT: 292.55 LBS

## 2018-08-27 DIAGNOSIS — M51.26 LUMBAR DISC HERNIATION: ICD-10-CM

## 2018-08-27 DIAGNOSIS — R29.2 HYPOREFLEXIA: ICD-10-CM

## 2018-08-27 DIAGNOSIS — M54.16 LUMBAR RADICULOPATHY: ICD-10-CM
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
## 2018-08-27 DIAGNOSIS — Z74.09 IMPAIRED MOBILITY AND ADLS: ICD-10-CM

## 2018-08-27 DIAGNOSIS — Z78.9 IMPAIRED MOBILITY AND ADLS: ICD-10-CM

## 2018-08-27 PROCEDURE — 72100 X-RAY EXAM L-S SPINE 2/3 VWS: CPT

## 2018-08-27 PROCEDURE — 99214 OFFICE O/P EST MOD 30 MIN: CPT | Performed by: PHYSICAL MEDICINE & REHABILITATION

## 2018-08-27 RX ORDER — MELOXICAM 7.5 MG/1
15 TABLET ORAL DAILY
Qty: 60 TAB | Refills: 0 | Status: SHIPPED | OUTPATIENT
Start: 2018-08-27 | End: 2018-09-26

## 2018-08-27 ASSESSMENT — PAIN SCALES - GENERAL: PAINLEVEL: 8=MODERATE-SEVERE PAIN

## 2018-08-27 NOTE — Clinical Note
Abel Caal, the patient has an acute exacerbation of his disc herniation with lumbar radiculopathy.  I have initiated workup, ordered an anti-inflammatory as well as ordered a transforaminal epidural steroid injection which I will perform.  I will continue to follow up with the patient.  Wil

## 2018-08-27 NOTE — TELEPHONE ENCOUNTER
Pt's mother called to let us know that the pt had hurt himself from lifting a washing machine and falling down some stairs. She says that he has difficulty even getting out of bed. Will try to schedule for this morning 08/27/18

## 2018-08-27 NOTE — PROGRESS NOTES
Follow up patient note  Pain Medicine, Interventional spine and sports physiatry, Physical medicine rehabilitation      Chief complaint:   Chief Complaint   Patient presents with   • Follow-Up     Low back pain          HISTORY    Please see new patient note dated 4/12/2018  by Dr Anderson,  for more details.     HPI  Patient identification: Peña Olson 22 y.o. male  With a right L5-S1 disc herniation and right L5 radiculopathy    Interval history:    The patient had previously had an excellent result with physical therapy, anti-inflammatory medications and had a significant improvement in his symptoms to the point where he was nearly symptom-free.  However 1 week ago the patient was lifting a washing machine and felt severe pain in the right low back radiating down the right leg into the heel and into the dorsal aspect of the foot which was similar to what he felt before.  Patient is experiencing severe pain since that point.  After he felt the pain he actually fell and landed on his buttocks and right low back in the same area of pain as well.  He continues to have severe right low back pain radiating down the right leg worse with sitting, shooting in quality, constant.  He has difficulty with ADLs and difficulty with lower body dressing and tying his shoes because of severe pain.  He has significant pain with bending forward.  He is having difficulty doing his job because of all these issues.         ROS Red Flags :   Fever, Chills, Sweats: Denies  Involuntary Weight Loss: Denies  Bowel/Bladder Incontinence: Denies  Saddle Anesthesia: Denies        PMHx:   Past Medical History:   Diagnosis Date   • Acute right-sided low back pain with right-sided sciatica 3/29/2018    Acute injury lifting weights on 2/28/18   • Hypertension    • Hypertriglyceridemia 3/29/2018   • Inflamed skin tag 2/15/2018   • Use of opiates for therapeutic purposes 3/29/2018       PSHx:   History reviewed. No pertinent surgical  "history.    Family history   Denies neuromuscular disease  Family History   Problem Relation Age of Onset   • Hypertension Mother    • Cancer Maternal Aunt         bipolar   • Breast Cancer Maternal Aunt    • Cancer Maternal Grandmother         schizophrenia   • Hypertension Maternal Grandfather    • Diabetes Maternal Grandfather    • Heart Disease Maternal Grandfather    • No Known Problems Father    • Diabetes Paternal Grandfather          Medications:   Current Outpatient Prescriptions   Medication   • meloxicam (MOBIC) 7.5 MG Tab   • MELATONIN PO   • Cholecalciferol (VITAMIN D3) 3000 units Tab   • methocarbamol (ROBAXIN) 750 MG Tab     No current facility-administered medications for this visit.        Allergies:   No Known Allergies    Social Hx:   Social History     Social History   • Marital status: Single     Spouse name: N/A   • Number of children: N/A   • Years of education: N/A     Occupational History   • Not on file.     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    Constitutional:   Body Habitus: Body mass index is 41.98 kg/m².  Cooperation: Fully cooperates with exam  Appearance: Well-groomed no disheveled     Respiratory-  breathing comfortable on room air, no audible wheezing  Cardiovascular- " capillary refills less than 2 seconds. No lower extremity edema is noted.   Psychiatric- alert and oriented ×3. Normal affect.   Spine: Extension rotation maneuver is negative.  Slump test and straight leg raise test are positive on the right, negative on the left.  Sensation is intact in the bilateral lower extremities.. Strength 5/5 BL.  Reflexes are 2+ in the bilateral patella, left Achilles.  Reflexes 1+ in the right Achilles.  No significant tenderness to palpation throughout the lumbar and thoracic spine or paraspinous regions.  No pain with percussion.  Palpation and percussion are difficult exam maneuvers given body habitus.      MEDICAL DECISION MAKING    DATA    Labs:   Lab Results   Component Value Date/Time    SODIUM 142 02/21/2018 12:55 PM    POTASSIUM 4.0 02/21/2018 12:55 PM    CHLORIDE 107 02/21/2018 12:55 PM    CO2 26 02/21/2018 12:55 PM    GLUCOSE 81 02/21/2018 12:55 PM    BUN 13 02/21/2018 12:55 PM    CREATININE 0.83 02/21/2018 12:55 PM        No results found for: PROTHROMBTM, INR     Lab Results   Component Value Date/Time    WBC 12.1 (H) 07/28/2017 12:26 PM    RBC 5.97 07/28/2017 12:26 PM    HEMOGLOBIN 16.6 07/28/2017 12:26 PM    HEMATOCRIT 50.2 07/28/2017 12:26 PM    MCV 84.1 07/28/2017 12:26 PM    MCH 27.8 07/28/2017 12:26 PM    MCHC 33.1 (L) 07/28/2017 12:26 PM    MPV 10.2 07/28/2017 12:26 PM    NEUTSPOLYS 56.50 07/28/2017 12:26 PM    LYMPHOCYTES 33.80 07/28/2017 12:26 PM    MONOCYTES 7.20 07/28/2017 12:26 PM    EOSINOPHILS 1.30 07/28/2017 12:26 PM    BASOPHILS 0.70 07/28/2017 12:26 PM    HYPOCHROMIA 1+ 10/09/2013 02:08 PM        Lab Results   Component Value Date/Time    HBA1C 5.9 (H) 02/21/2018 12:55 PM          Imaging: I personally reviewed following images  MRI lumbar spine dated 4/26/2018  L5-S1 central to right paracentric disc protrusion with mild right neuroforaminal stenosis which abuts the right L5 and S1 nerve root .      I reviewed the following radiology reports                                         Results for orders placed during the hospital encounter of 04/26/18   MR-LUMBAR SPINE-W/O    Impression L5-S1 central disc protrusion and mild bilateral facet degeneration. There is no central canal narrowing. There is mild bilateral neuroforaminal narrowing.                                                              DIAGNOSIS   Visit Diagnoses     ICD-10-CM   1. Lumbar disc herniation L5-S1 M51.26   2. Lumbar radiculopathy M54.16   3. Impaired mobility and ADLs Z74.09   4. Hyporeflexia right S1 R29.2   5. Fall, initial encounter W19.XXXA         ASSESSMENT and PLAN:     Peña Olson 22 y.o. Male with severe right low back pain radiating down the right leg, acute on chronic, significant functional abnormalities, neurotension signs positive, hyperreflexia.  The patient has been taking over-the-counter NSAIDs as well as Suboxone and still has severe pain.  This impairs function as well as his ability to work.    Peña was seen today for follow-up.    Diagnoses and all orders for this visit:    Lumbar disc herniation L5-S1  -     meloxicam (MOBIC) 7.5 MG Tab; Take 2 Tabs by mouth every day for 30 days. For 30 days then stop. Do not take other NSAIDs. No refills.  -     DX-LUMBAR SPINE-2 OR 3 VIEWS; Future    Lumbar radiculopathy  Comments:  Not controlled with exacerbation and significant functional abnormalities.  I ordered a right L5-S1 and S1 transforaminal epidural steroid injection   Orders:  -     meloxicam (MOBIC) 7.5 MG Tab; Take 2 Tabs by mouth every day for 30 days. For 30 days then stop. Do not take other NSAIDs. No refills.  -     REFERRAL TO PHYSICIAL MEDICINE REHAB  -     DX-LUMBAR SPINE-2 OR 3 VIEWS; Future    Impaired mobility and ADLs  -     meloxicam (MOBIC) 7.5 MG Tab; Take 2 Tabs by mouth every day for 30 days. For 30 days then stop. Do not take other NSAIDs. No refills.    Hyporeflexia right S1  -     meloxicam (MOBIC) 7.5 MG Tab; Take 2 Tabs by mouth every day  for 30 days. For 30 days then stop. Do not take other NSAIDs. No refills.    Fall, initial encounter  -     DX-LUMBAR SPINE-2 OR 3 VIEWS; Future      The risks benefits and alternatives to this procedure were discussed and the patient wishes to proceed with the procedure. Risks include but are not limited to damage to surrounding structures, infection, bleeding, worsening of pain which can be permanent, weakness which can be permanent. Benefits include pain relief, improved function. Alternatives includes not doing the procedure.         I also discussed the assessment and plan with the patient's mother.    Follow up: 2 weeks after the above procedure    Thank you for allowing me to participate in the care of this patient. If you have any questions please not hesitate to contact me.        Please note that this dictation was created using voice recognition software. I have made every reasonable attempt to correct obvious errors but there may be errors of grammar and content that I may have overlooked prior to finalization of this note.      Wil Anderson MD  Interventional Spine and Sports Physiatry  Physical Medicine and Rehabilitation  [facility]  8/27/2018  11:17 AM

## 2018-08-28 ENCOUNTER — APPOINTMENT (OUTPATIENT)
Dept: PHYSICAL MEDICINE AND REHAB | Facility: MEDICAL CENTER | Age: 23
End: 2018-08-28
Payer: COMMERCIAL

## 2018-09-11 RX ORDER — METHOCARBAMOL 750 MG/1
TABLET, FILM COATED ORAL
Qty: 90 TAB | Refills: 0 | Status: SHIPPED | OUTPATIENT
Start: 2018-09-11 | End: 2019-01-15 | Stop reason: SDUPTHER

## 2018-09-17 ENCOUNTER — HOSPITAL ENCOUNTER (OUTPATIENT)
Dept: PAIN MANAGEMENT | Facility: REHABILITATION | Age: 23
End: 2018-09-17
Attending: PHYSICAL MEDICINE & REHABILITATION
Payer: COMMERCIAL

## 2018-10-11 ENCOUNTER — APPOINTMENT (OUTPATIENT)
Dept: PHYSICAL MEDICINE AND REHAB | Facility: MEDICAL CENTER | Age: 23
End: 2018-10-11
Payer: COMMERCIAL

## 2019-01-15 RX ORDER — METHOCARBAMOL 750 MG/1
TABLET, FILM COATED ORAL
Qty: 90 TAB | Refills: 0 | Status: SHIPPED | OUTPATIENT
Start: 2019-01-15 | End: 2019-03-08 | Stop reason: SDUPTHER

## 2019-03-08 RX ORDER — METHOCARBAMOL 750 MG/1
TABLET, FILM COATED ORAL
Qty: 90 TAB | Refills: 0 | Status: SHIPPED | OUTPATIENT
Start: 2019-03-08 | End: 2019-09-04 | Stop reason: SDUPTHER

## 2019-09-05 RX ORDER — METHOCARBAMOL 750 MG/1
TABLET, FILM COATED ORAL
Qty: 90 TAB | Refills: 0 | Status: SHIPPED | OUTPATIENT
Start: 2019-09-05

## 2020-03-26 ENCOUNTER — OFFICE VISIT (OUTPATIENT)
Dept: MEDICAL GROUP | Facility: MEDICAL CENTER | Age: 25
End: 2020-03-26
Payer: COMMERCIAL

## 2020-03-26 VITALS
RESPIRATION RATE: 16 BRPM | SYSTOLIC BLOOD PRESSURE: 136 MMHG | TEMPERATURE: 98.2 F | BODY MASS INDEX: 40.66 KG/M2 | WEIGHT: 284 LBS | DIASTOLIC BLOOD PRESSURE: 80 MMHG | OXYGEN SATURATION: 96 % | HEART RATE: 86 BPM | HEIGHT: 70 IN

## 2020-03-26 DIAGNOSIS — E66.01 MORBID OBESITY WITH BMI OF 40.0-44.9, ADULT (HCC): ICD-10-CM

## 2020-03-26 DIAGNOSIS — K21.9 GASTROESOPHAGEAL REFLUX DISEASE WITHOUT ESOPHAGITIS: ICD-10-CM

## 2020-03-26 DIAGNOSIS — Z20.2 ENCOUNTER FOR ASSESSMENT OF STD EXPOSURE: ICD-10-CM

## 2020-03-26 PROCEDURE — 99214 OFFICE O/P EST MOD 30 MIN: CPT | Performed by: FAMILY MEDICINE

## 2020-03-26 RX ORDER — OMEPRAZOLE 20 MG/1
20 CAPSULE, DELAYED RELEASE ORAL DAILY
Qty: 90 CAP | Refills: 3 | Status: SHIPPED | OUTPATIENT
Start: 2020-03-26

## 2020-03-26 ASSESSMENT — PATIENT HEALTH QUESTIONNAIRE - PHQ9: CLINICAL INTERPRETATION OF PHQ2 SCORE: 0

## 2020-03-26 NOTE — ASSESSMENT & PLAN NOTE
This is a chronic health problem for this patient where he has lost approximately 10 pounds.  He continues to be physically active through his job and working on weight loss.

## 2020-03-26 NOTE — ASSESSMENT & PLAN NOTE
This is a new problem for this patient that started approximately 2 weeks ago where he started noticing that he actually has reflux that comes into his esophagus at times he is actually had it come out his nose and even 1 time he aspirated it and it caused him to start coughing.  Patient unfortunately has a job where he works till 11:00 and then is usually in bed by midnight.  We had a discussion about trying to use Prilosec 20 mg taking it 30 minutes before protein containing meal so that we can shut down the acid production and then he needs to try and eat earlier in the evening so that once he gets home he does not just go to eat and go straight to bed.  Patient willing to try this.  We will try the medication for 6 weeks he will also make those changes in his lifestyle for those 6 weeks and will hope we see improvement.

## 2020-03-26 NOTE — PROGRESS NOTES
CC:Diagnoses of Gastroesophageal reflux disease without esophagitis, Encounter for assessment of STD exposure, and Morbid obesity with BMI of 40.0-44.9, adult ([facility]) were pertinent to this visit.    HISTORY OF PRESENT ILLNESS: Patient is a 24 y.o. male established patient who presents today to talk about chronic health problems as outlined below.  Patient also concerned and wants to be tested for STDs because of possible exposure      Gastroesophageal reflux disease without esophagitis  This is a new problem for this patient that started approximately 2 weeks ago where he started noticing that he actually has reflux that comes into his esophagus at times he is actually had it come out his nose and even 1 time he aspirated it and it caused him to start coughing.  Patient unfortunately has a job where he works till 11:00 and then is usually in bed by midnight.  We had a discussion about trying to use Prilosec 20 mg taking it 30 minutes before protein containing meal so that we can shut down the acid production and then he needs to try and eat earlier in the evening so that once he gets home he does not just go to eat and go straight to bed.  Patient willing to try this.  We will try the medication for 6 weeks he will also make those changes in his lifestyle for those 6 weeks and will hope we see improvement.    Encounter for assessment of STD exposure  Patient concerned as to whether or not he has developed any STDs.  He has unprotected sex.  He has absolutely no symptoms just wants to be certain that he does not have any illnesses either.    Morbid obesity with BMI of 40.0-44.9, adult ([facility])  This is a chronic health problem for this patient where he has lost approximately 10 pounds.  He continues to be physically active through his job and working on weight loss.      Patient Active Problem List    Diagnosis Date Noted   • Gastroesophageal reflux disease without esophagitis 03/26/2020   • Encounter for assessment of  STD exposure 03/26/2020   • Acute right-sided low back pain with right-sided sciatica 03/29/2018   • Use of opiates for therapeutic purposes 03/29/2018   • Hypertriglyceridemia 03/29/2018   • Inflamed skin tag 02/15/2018   • Morbid obesity with BMI of 40.0-44.9, adult (HCC) 04/10/2017   • Prediabetes 01/25/2017   • Mixed hyperlipidemia 01/25/2017   • Vitamin D deficiency 01/25/2017   • Tinnitus 10/19/2016   • History of tics 10/19/2016   • Primary insomnia 10/19/2016   • Situational anxiety 10/19/2016   • Snoring 10/19/2016      Allergies:Patient has no known allergies.    Current Outpatient Medications   Medication Sig Dispense Refill   • omeprazole (PRILOSEC) 20 MG delayed-release capsule Take 1 Cap by mouth every day. 90 Cap 3   • methocarbamol (ROBAXIN) 750 MG Tab TAKE ONE TABLET BY MOUTH THREE TIMES DAILY  90 Tab 0   • MELATONIN PO Take  by mouth.     • Cholecalciferol (VITAMIN D3) 3000 units Tab Take  by mouth 2 Times a Day.       No current facility-administered medications for this visit.        Social History     Tobacco Use   • Smoking status: Never Smoker   • Smokeless tobacco: Never Used   Substance Use Topics   • Alcohol use: No   • Drug use: No     Social History     Social History Narrative   • Not on file       Family History   Problem Relation Age of Onset   • Hypertension Mother    • Cancer Maternal Aunt         bipolar   • Breast Cancer Maternal Aunt    • Cancer Maternal Grandmother         schizophrenia   • Hypertension Maternal Grandfather    • Diabetes Maternal Grandfather    • Heart Disease Maternal Grandfather    • No Known Problems Father    • Diabetes Paternal Grandfather         ROS:     - Constitutional:  Negative for fever, chills, unexpected weight change, and fatigue/generalized weakness.    - HEENT:  Negative for headaches, vision changes, hearing changes, ear pain, ear discharge, rhinorrhea, sinus congestion, sore throat, and neck pain.      - Respiratory: Negative for cough, sputum  "production, chest congestion, dyspnea, wheezing, and crackles.      - Cardiovascular: Negative for chest pain, palpitations, orthopnea, and bilateral lower extremity edema.     - Gastrointestinal: Heartburn as described above otherwise negative for nausea, vomiting, abdominal pain, hematochezia, melena, diarrhea, constipation, and greasy/foul-smelling stools.     - Genitourinary: Negative for dysuria, polyuria, hematuria, pyuria, urinary urgency, and urinary incontinence.     - Musculoskeletal: Negative for myalgias, back pain, and joint pain.     - Skin: Negative for rash, itching, cyanotic skin color change.     - Neurological: Negative for dizziness, tingling, tremors, focal sensory deficit, focal weakness and headaches.     - Endo/Heme/Allergies: Does not bruise/bleed easily.     - Psychiatric/Behavioral: Negative for depression, suicidal/homicidal ideation and memory loss.          - NOTE: All other systems reviewed and are negative, except as in HPI.      Exam:    /80 (BP Location: Left arm, Patient Position: Sitting, BP Cuff Size: Large adult)   Pulse 86   Temp 36.8 °C (98.2 °F) (Temporal)   Resp 16   Ht 1.778 m (5' 10\")   Wt (!) 128.8 kg (284 lb)   SpO2 96%  Body mass index is 40.75 kg/m².    General:  Well nourished, well developed male in NAD  Head is grossly normal.  Neck: Supple without JVD or bruit. Thyroid is not enlarged.  Pulmonary: Clear to ausculation and percussion.  Normal effort. No rales, ronchi, or wheezing.  Cardiovascular: Regular rate and rhythm without murmur. Carotid and radial pulses are intact and equal bilaterally.  Extremities: no clubbing, cyanosis, or edema.    Please note that this dictation was created using voice recognition software. I have made every reasonable attempt to correct obvious errors, but I expect that there are errors of grammar and possibly content that I did not discover before finalizing the note.    Assessment/Plan:  1. Gastroesophageal reflux " disease without esophagitis  Uncontrolled, we will try omeprazole 20 mg taking it 30 minutes before protein containing meal see if that is helpful.    2. Encounter for assessment of STD exposure  We will screen the patient and notify him via MyChart of these results  - HIV AG/AB COMBO ASSAY SCREENING; Future  - T.PALLIDUM AB EIA; Future  - CHLAMYDIA/GC PCR URINE OR SWAB; Future    3. Morbid obesity with BMI of 40.0-44.9, adult (HCC)  Patient continues to work on weight loss  - Patient identified as having weight management issue.  Appropriate orders and counseling given.

## 2020-03-26 NOTE — ASSESSMENT & PLAN NOTE
Patient concerned as to whether or not he has developed any STDs.  He has unprotected sex.  He has absolutely no symptoms just wants to be certain that he does not have any illnesses either.